# Patient Record
Sex: FEMALE | Race: WHITE | NOT HISPANIC OR LATINO
[De-identification: names, ages, dates, MRNs, and addresses within clinical notes are randomized per-mention and may not be internally consistent; named-entity substitution may affect disease eponyms.]

---

## 2017-03-01 ENCOUNTER — APPOINTMENT (OUTPATIENT)
Dept: OBGYN | Facility: CLINIC | Age: 67
End: 2017-03-01

## 2017-03-01 ENCOUNTER — OUTPATIENT (OUTPATIENT)
Dept: OUTPATIENT SERVICES | Facility: HOSPITAL | Age: 67
LOS: 1 days | Discharge: HOME | End: 2017-03-01

## 2017-03-01 VITALS — WEIGHT: 220 LBS | HEIGHT: 68 IN | BODY MASS INDEX: 33.34 KG/M2

## 2017-03-01 DIAGNOSIS — Z78.0 ASYMPTOMATIC MENOPAUSAL STATE: ICD-10-CM

## 2017-03-03 ENCOUNTER — RECORD ABSTRACTING (OUTPATIENT)
Age: 67
End: 2017-03-03

## 2017-03-03 DIAGNOSIS — Z87.410 PERSONAL HISTORY OF CERVICAL DYSPLASIA: ICD-10-CM

## 2017-03-03 DIAGNOSIS — Z80.3 FAMILY HISTORY OF MALIGNANT NEOPLASM OF BREAST: ICD-10-CM

## 2017-03-03 DIAGNOSIS — Z12.4 ENCOUNTER FOR SCREENING FOR MALIGNANT NEOPLASM OF CERVIX: ICD-10-CM

## 2017-03-06 ENCOUNTER — RESULT REVIEW (OUTPATIENT)
Age: 67
End: 2017-03-06

## 2017-03-10 LAB — HPV E6+E7 MRNA CVX QL NAA+PROBE: NOT DETECTED

## 2017-06-27 DIAGNOSIS — Z01.419 ENCOUNTER FOR GYNECOLOGICAL EXAMINATION (GENERAL) (ROUTINE) WITHOUT ABNORMAL FINDINGS: ICD-10-CM

## 2017-08-10 ENCOUNTER — OUTPATIENT (OUTPATIENT)
Dept: OUTPATIENT SERVICES | Facility: HOSPITAL | Age: 67
LOS: 1 days | Discharge: HOME | End: 2017-08-10

## 2017-08-10 ENCOUNTER — APPOINTMENT (OUTPATIENT)
Dept: GERIATRICS | Facility: CLINIC | Age: 67
End: 2017-08-10

## 2017-08-10 VITALS
BODY MASS INDEX: 32.58 KG/M2 | DIASTOLIC BLOOD PRESSURE: 90 MMHG | WEIGHT: 215 LBS | HEART RATE: 72 BPM | HEIGHT: 68 IN | SYSTOLIC BLOOD PRESSURE: 165 MMHG

## 2017-08-10 DIAGNOSIS — Z00.00 ENCOUNTER FOR GENERAL ADULT MEDICAL EXAMINATION W/OUT ABNORMAL FINDINGS: ICD-10-CM

## 2017-08-10 DIAGNOSIS — Z87.891 PERSONAL HISTORY OF NICOTINE DEPENDENCE: ICD-10-CM

## 2017-08-11 DIAGNOSIS — N85.01 BENIGN ENDOMETRIAL HYPERPLASIA: ICD-10-CM

## 2017-08-11 DIAGNOSIS — R61 GENERALIZED HYPERHIDROSIS: ICD-10-CM

## 2017-08-11 DIAGNOSIS — I10 ESSENTIAL (PRIMARY) HYPERTENSION: ICD-10-CM

## 2017-08-13 ENCOUNTER — TRANSCRIPTION ENCOUNTER (OUTPATIENT)
Age: 67
End: 2017-08-13

## 2017-09-06 ENCOUNTER — OTHER (OUTPATIENT)
Age: 67
End: 2017-09-06

## 2017-09-07 ENCOUNTER — APPOINTMENT (OUTPATIENT)
Dept: OBGYN | Facility: CLINIC | Age: 67
End: 2017-09-07

## 2017-09-07 ENCOUNTER — OUTPATIENT (OUTPATIENT)
Dept: OUTPATIENT SERVICES | Facility: HOSPITAL | Age: 67
LOS: 1 days | Discharge: HOME | End: 2017-09-07

## 2017-09-07 VITALS
HEIGHT: 68 IN | BODY MASS INDEX: 32.04 KG/M2 | WEIGHT: 211.4 LBS | SYSTOLIC BLOOD PRESSURE: 160 MMHG | DIASTOLIC BLOOD PRESSURE: 70 MMHG

## 2017-09-08 ENCOUNTER — RESULT REVIEW (OUTPATIENT)
Age: 67
End: 2017-09-08

## 2017-09-20 ENCOUNTER — MED ADMIN CHARGE (OUTPATIENT)
Age: 67
End: 2017-09-20

## 2017-09-20 ENCOUNTER — OUTPATIENT (OUTPATIENT)
Dept: OUTPATIENT SERVICES | Facility: HOSPITAL | Age: 67
LOS: 1 days | Discharge: HOME | End: 2017-09-20

## 2017-09-20 ENCOUNTER — APPOINTMENT (OUTPATIENT)
Dept: GERIATRICS | Facility: CLINIC | Age: 67
End: 2017-09-20

## 2017-09-20 VITALS
HEART RATE: 62 BPM | HEIGHT: 68 IN | BODY MASS INDEX: 32.13 KG/M2 | SYSTOLIC BLOOD PRESSURE: 148 MMHG | DIASTOLIC BLOOD PRESSURE: 83 MMHG | WEIGHT: 212 LBS

## 2017-09-21 DIAGNOSIS — E03.9 HYPOTHYROIDISM, UNSPECIFIED: ICD-10-CM

## 2017-09-21 DIAGNOSIS — I10 ESSENTIAL (PRIMARY) HYPERTENSION: ICD-10-CM

## 2017-09-21 DIAGNOSIS — Z23 ENCOUNTER FOR IMMUNIZATION: ICD-10-CM

## 2017-09-21 DIAGNOSIS — N85.01 BENIGN ENDOMETRIAL HYPERPLASIA: ICD-10-CM

## 2018-02-11 ENCOUNTER — INPATIENT (INPATIENT)
Facility: HOSPITAL | Age: 68
LOS: 1 days | Discharge: HOME | End: 2018-02-13
Attending: INTERNAL MEDICINE

## 2018-02-11 VITALS
DIASTOLIC BLOOD PRESSURE: 93 MMHG | HEART RATE: 102 BPM | SYSTOLIC BLOOD PRESSURE: 171 MMHG | RESPIRATION RATE: 18 BRPM | TEMPERATURE: 98 F | OXYGEN SATURATION: 96 %

## 2018-02-11 DIAGNOSIS — Z90.89 ACQUIRED ABSENCE OF OTHER ORGANS: Chronic | ICD-10-CM

## 2018-02-11 DIAGNOSIS — Z98.890 OTHER SPECIFIED POSTPROCEDURAL STATES: Chronic | ICD-10-CM

## 2018-02-11 DIAGNOSIS — I50.9 HEART FAILURE, UNSPECIFIED: ICD-10-CM

## 2018-02-11 DIAGNOSIS — E03.9 HYPOTHYROIDISM, UNSPECIFIED: ICD-10-CM

## 2018-02-11 DIAGNOSIS — I10 ESSENTIAL (PRIMARY) HYPERTENSION: ICD-10-CM

## 2018-02-11 DIAGNOSIS — I48.91 UNSPECIFIED ATRIAL FIBRILLATION: ICD-10-CM

## 2018-02-11 DIAGNOSIS — R07.9 CHEST PAIN, UNSPECIFIED: ICD-10-CM

## 2018-02-11 LAB
ANION GAP SERPL CALC-SCNC: 12 MMOL/L — SIGNIFICANT CHANGE UP (ref 7–14)
APTT BLD: 27.8 SEC — SIGNIFICANT CHANGE UP (ref 27–39.2)
B-TYPE NATRIURETIC PEPTIDE BNP RESULT: 636 PG/ML — HIGH (ref 0–99)
BUN SERPL-MCNC: 14 MG/DL — SIGNIFICANT CHANGE UP (ref 10–20)
CALCIUM SERPL-MCNC: 9.1 MG/DL — SIGNIFICANT CHANGE UP (ref 8.5–10.1)
CHLORIDE SERPL-SCNC: 104 MMOL/L — SIGNIFICANT CHANGE UP (ref 98–110)
CK MB BLD-MCNC: 4 % — SIGNIFICANT CHANGE UP (ref 0–4)
CK MB CFR SERPL CALC: 2.5 NG/ML — SIGNIFICANT CHANGE UP (ref 0.6–6.3)
CK SERPL-CCNC: 64 U/L — SIGNIFICANT CHANGE UP (ref 0–225)
CO2 SERPL-SCNC: 23 MMOL/L — SIGNIFICANT CHANGE UP (ref 17–32)
CREAT SERPL-MCNC: 0.9 MG/DL — SIGNIFICANT CHANGE UP (ref 0.7–1.5)
GLUCOSE SERPL-MCNC: 71 MG/DL — SIGNIFICANT CHANGE UP (ref 70–110)
INR BLD: 1.22 RATIO — SIGNIFICANT CHANGE UP (ref 0.65–1.3)
LIDOCAIN IGE QN: 20 U/L — SIGNIFICANT CHANGE UP (ref 7–60)
POTASSIUM SERPL-MCNC: 4.1 MMOL/L — SIGNIFICANT CHANGE UP (ref 3.5–5)
POTASSIUM SERPL-SCNC: 4.1 MMOL/L — SIGNIFICANT CHANGE UP (ref 3.5–5)
PROTHROM AB SERPL-ACNC: 13.2 SEC — HIGH (ref 9.95–12.87)
SODIUM SERPL-SCNC: 139 MMOL/L — SIGNIFICANT CHANGE UP (ref 135–146)
TROPONIN I SERPL-MCNC: 0.03 NG/ML — SIGNIFICANT CHANGE UP (ref 0–0.05)

## 2018-02-11 RX ORDER — LEVOTHYROXINE SODIUM 125 MCG
0 TABLET ORAL
Qty: 0 | Refills: 0 | COMMUNITY

## 2018-02-11 RX ORDER — METOPROLOL TARTRATE 50 MG
25 TABLET ORAL
Qty: 0 | Refills: 0 | Status: DISCONTINUED | OUTPATIENT
Start: 2018-02-11 | End: 2018-02-12

## 2018-02-11 RX ORDER — ASPIRIN/CALCIUM CARB/MAGNESIUM 324 MG
81 TABLET ORAL DAILY
Qty: 0 | Refills: 0 | Status: DISCONTINUED | OUTPATIENT
Start: 2018-02-11 | End: 2018-02-13

## 2018-02-11 RX ORDER — ENOXAPARIN SODIUM 100 MG/ML
80 INJECTION SUBCUTANEOUS ONCE
Qty: 0 | Refills: 0 | Status: COMPLETED | OUTPATIENT
Start: 2018-02-11 | End: 2018-02-11

## 2018-02-11 RX ORDER — ENOXAPARIN SODIUM 100 MG/ML
80 INJECTION SUBCUTANEOUS
Qty: 0 | Refills: 0 | Status: DISCONTINUED | OUTPATIENT
Start: 2018-02-12 | End: 2018-02-12

## 2018-02-11 RX ORDER — SODIUM CHLORIDE 9 MG/ML
3 INJECTION INTRAMUSCULAR; INTRAVENOUS; SUBCUTANEOUS ONCE
Qty: 0 | Refills: 0 | Status: COMPLETED | OUTPATIENT
Start: 2018-02-11 | End: 2018-02-11

## 2018-02-11 RX ORDER — LEVOTHYROXINE SODIUM 125 MCG
50 TABLET ORAL DAILY
Qty: 0 | Refills: 0 | Status: DISCONTINUED | OUTPATIENT
Start: 2018-02-11 | End: 2018-02-13

## 2018-02-11 RX ORDER — ASPIRIN/CALCIUM CARB/MAGNESIUM 324 MG
325 TABLET ORAL ONCE
Qty: 0 | Refills: 0 | Status: COMPLETED | OUTPATIENT
Start: 2018-02-11 | End: 2018-02-11

## 2018-02-11 RX ORDER — FUROSEMIDE 40 MG
40 TABLET ORAL
Qty: 0 | Refills: 0 | Status: DISCONTINUED | OUTPATIENT
Start: 2018-02-11 | End: 2018-02-12

## 2018-02-11 RX ADMIN — SODIUM CHLORIDE 3 MILLILITER(S): 9 INJECTION INTRAMUSCULAR; INTRAVENOUS; SUBCUTANEOUS at 10:00

## 2018-02-11 RX ADMIN — Medication 325 MILLIGRAM(S): at 10:13

## 2018-02-11 RX ADMIN — Medication 40 MILLIGRAM(S): at 21:44

## 2018-02-11 RX ADMIN — ENOXAPARIN SODIUM 80 MILLIGRAM(S): 100 INJECTION SUBCUTANEOUS at 21:41

## 2018-02-11 RX ADMIN — Medication 81 MILLIGRAM(S): at 21:46

## 2018-02-11 RX ADMIN — Medication 25 MILLIGRAM(S): at 21:45

## 2018-02-11 NOTE — H&P ADULT - FAMILY HISTORY
Grandparent  Still living? Unknown  Family history of heart disease, Age at diagnosis: Age Unknown     Mother  Still living? Unknown  Family history of breast cancer, Age at diagnosis: Age Unknown

## 2018-02-11 NOTE — H&P ADULT - PROBLEM SELECTOR PLAN 5
- R/O ACS  - Check serial CE  - Daily EKGs  - F/U with cardiology - R/O ACS  - Check serial CE  - Continue Aspirin  - Daily EKGs  - F/U with cardiology

## 2018-02-11 NOTE — ED PROVIDER NOTE - PROGRESS NOTE DETAILS
Patient initially improved with cardizem, but HR over time increased back into 140s/150s. patient adamant that she wants to go home to feed her cats. patient agreed to let me speak to friends/family who came to bedside. fam/friends say they will feed cat and patient should stay but she still wants to go home. understands the risk of death.  patient agreed to let me giver her another IV push of cardizem and to give her an oral dose. will re assess and push for admission spoke to radiologist who says that patient does not have PE; bilateral pleural effusions, continues to fit CHF picture

## 2018-02-11 NOTE — H&P ADULT - ATTENDING COMMENTS
Patient seen and examined independently agree with resident.    Afib with RVR-- iv cardizem changed to multaq may get cardioversion either inpatient if not controlled with multaq or outpatient. Continue lovenox.     CF lasix 40mg changed from IV to PO. echo pending      Hypothyroidim on synthroid  awaiting TSH levels Patient seen and examined independently agree with resident.    Afib with RVR-- iv cardizem changed to multaq may get cardioversion either inpatient if not controlled with multaq or outpatient. Continue eliquis.     CF lasix 40mg changed from IV to PO. echo pending      Hypothyroidim on synthroid  awaiting TSH levels

## 2018-02-11 NOTE — ED PROVIDER NOTE - NS ED ROS FT
Eyes:  No visual changes, eye pain or discharge.  ENMT:  No hearing changes, pain, discharge or infections. No neck pain or stiffness.  Cardiac: see hpi  Respiratory:  see hpi  GI:  No nausea, vomiting, diarrhea or abdominal pain.  :  No dysuria, frequency or burning.  MS:  No myalgia, muscle weakness, joint pain or back pain.  Neuro:  No headache or weakness.  No LOC.  Skin:  No skin rash.   Endocrine: No history of thyroid disease or diabetes.

## 2018-02-11 NOTE — ED PROVIDER NOTE - OBJECTIVE STATEMENT
67yF pmh including afib, not on blood thinners, HTN, "pre cancerous" lesion in uterus on IUD, cc sob x 1-2 days, woke from sleep this morning, worse with exertion, intermittent, started yesterday as pain under right rib. no relation to food, no nausea or vomiting. no back pain.

## 2018-02-11 NOTE — H&P ADULT - NSHPPHYSICALEXAM_GEN_ALL_CORE
Vital Signs Last 24 Hrs  T(C): 36.3 (11 Feb 2018 17:35), Max: 36.7 (11 Feb 2018 07:59)  T(F): 97.4 (11 Feb 2018 17:35), Max: 98.1 (11 Feb 2018 07:59)  HR: 96 (11 Feb 2018 17:35) (96 - 156)  BP: 130/88 (11 Feb 2018 17:35) (113/55 - 171/93)  RR: 18 (11 Feb 2018 17:35) (18 - 18)  SpO2: 96% (11 Feb 2018 17:35) (96% - 97%)\    < from: CT Chest w/ IV Cont (02.11.18 @ 17:15) >  IMPRESSION:  No central pulmonary embolus.  Bilateral moderate pleural effusions, right greater than left.   Partially imaged upper abdomen is unremarkable.  < end of copied text >

## 2018-02-11 NOTE — H&P ADULT - ASSESSMENT
67 y.o female patient with PMH of atrial fibrillation diagnosed 5 years ago, htn currently controlled with lifestyle modifications, hypothyroidism presents with chest pain and shortness of breath on exertion, found to have a.fib with rvr.

## 2018-02-11 NOTE — H&P ADULT - PROBLEM SELECTOR PLAN 2
- Picture of CHF exacerbation with sob on exertion, le edema, pleural effusion and   - No known CHF history  - Will start Lasix 4mg q12h for now. Adjust dose based on clinical response. Monitor VS and BMP. Monitor strict I&O's  - Check 2D echo  - Most likely due to rapid ventricular rate - Picture of CHF exacerbation with sob on exertion, le edema, pleural effusion and   - No known CHF history  - Will start Lasix 4mg q12h for now. Adjust dose based on clinical response. Monitor VS and BMP. Monitor strict I&O's  - Check 2D echo  - Most likely due to rapid ventricular rate  - If reduced EF on 2D echo and based on other findings: control HR with beta-blockers, consider ischemic w/u and ACE-I/ARB if tolerated.

## 2018-02-11 NOTE — ED PROVIDER NOTE - CARE PLAN
Principal Discharge DX:	Atrial fibrillation with RVR  Secondary Diagnosis:	CHF (congestive heart failure)

## 2018-02-11 NOTE — ED PROVIDER NOTE - PHYSICAL EXAMINATION
CONSTITUTIONAL: Well-developed; well-nourished; in no acute distress.   SKIN: warm, dry  HEAD: Normocephalic; atraumatic.  EYES: no conj injection  ENT: No nasal discharge; airway clear.  NECK: Supple; non tender.  CARD: S1, S2 normal; no murmurs, gallops, or rubs. tachycardic, irregular rhythm, 2+ radial bilaterally  RESP: No wheezes, rales or rhonchi.  ABD: soft ntnd  EXT: Normal ROM.  No clubbing, cyanosis or edema.   LYMPH: No acute cervical adenopathy.  NEURO: Alert, oriented, grossly unremarkable  PSYCH: Cooperative, appropriate.

## 2018-02-11 NOTE — H&P ADULT - PROBLEM SELECTOR PLAN 1
- CHADSVASC 3  - Start Metoprolol 25mg q12 and titrate to effect  - Start Lovenox anticoagulation dose. Watch for signs and symptoms of bleeding  - Patient reports having a congenital "hole in the heart" (VSD?, ASD?, PFO?) but no interventions were needed  - Check 2D echo (as per patient, she had a 2D echo done more than 6 months but less than 1 year ago and reports being WNL; results not currently available)  - If no valvular disease, consider choosing Eliquis/Xarelto for anticoagulation  - F/U second set of cardiac enzymes  - Will place a cardiology consult (Dr. Suarez). Patient was on Multaq that was discontinued in 11/2017  - Check TSH  - Monitor VS  - Daily EKGs - CHADSVASC 3  - Start Metoprolol 25mg q12 and titrate to effect  - Start Lovenox 80mg q12. Please adjust dose (1mg/kg) if needed once weight is measured in-hospital. Watch for signs and symptoms of bleeding  - Patient reports having a congenital "hole in the heart" (VSD?, ASD?, PFO?) but no interventions were needed  - Check 2D echo (as per patient, she had a 2D echo done more than 6 months but less than 1 year ago and reports being WNL; results not currently available)  - If no valvular disease, consider choosing Eliquis/Xarelto for anticoagulation  - F/U second set of cardiac enzymes  - Will place a cardiology consult (Dr. Suarez). Patient was on Multaq that was discontinued in 11/2017  - Check TSH  - Monitor VS  - Daily EKGs - CHADSVASC 3  - Start Metoprolol 25mg q12 and titrate to effect  - Start Lovenox 80mg q12. Please adjust dose (1mg/kg) if needed once weight is measured in-hospital. No history of severe bleeding. Watch for signs and symptoms of bleeding  - Patient reports having a congenital "hole in the heart" (VSD?, ASD?, PFO?) but no interventions were needed  - Check 2D echo (as per patient, she had a 2D echo done more than 6 months but less than 1 year ago and reports being WNL; results not currently available)  - If no valvular disease, consider choosing Eliquis/Xarelto for anticoagulation  - F/U second set of cardiac enzymes  - Will place a cardiology consult (Dr. Suarez). Patient was on Multaq that was discontinued in 11/2017  - Check TSH  - Monitor VS  - Daily EKGs - CHADSVASC 3  - Control rate with Cardizem  - Start Lovenox 80mg q12. Please adjust dose (1mg/kg) if needed once weight is measured in-hospital. No history of severe bleeding. Watch for signs and symptoms of bleeding  - Patient reports having a congenital "hole in the heart" (VSD?, ASD?, PFO?) but no interventions were needed  - Check 2D echo (as per patient, she had a 2D echo done more than 6 months but less than 1 year ago and reports being WNL; results not currently available)  - If no valvular disease, consider choosing Eliquis/Xarelto for anticoagulation  - F/U second set of cardiac enzymes  - Will place a cardiology consult (Dr. Suarez). Patient was on Multaq that was discontinued in 11/2017  - Check TSH  - Monitor VS  - Daily EKGs

## 2018-02-11 NOTE — ED ADULT NURSE NOTE - OBJECTIVE STATEMENT
Pt c/o of ANGULO since friday. Pt states that it got worse this morning and had SOB upon walking up a flight of stairs. Pt has hx of Afib and had been taken off medication since 12/2017. Pt took herself off HTN medication around the same time.

## 2018-02-11 NOTE — H&P ADULT - HISTORY OF PRESENT ILLNESS
67 y.o female patient with PMH of atrial fibirillation, HTN, hypothyroidism, presented to the ED for chest pain and shortness of breath on exertion    History goes back to few days prior to presentation when patient started complaining of right lower rib pain. Today, patient started having shortness of breath after climbing her house's stairs. Shortness of breath associated with chest heaviness. SOB is worse on exertion and with deep inspirations and SOB and chest heaviness resolve at rest. She usually has bilateral lower extremity edema due to vein disease but lately she noticed worsening of her edema.  Patient usually is able to walk a few miles without any problem  Patient denies palpitations, fever, chills, viral illness, abdominal pain, N/V, diarrhea or constipation or any other symptoms.    She was diagnosed with atrial fibrillation 5 years ago and follows-up with Dr. Suarez. She was prescribed Multaq that was discontinued in 11/2017. She is not on anticoagulation. Also, as per patient, she was taken off of her antihypertensive medications in 11/2017 since she lost weight and her BP was controlled off medications    In ED, patient was in atrial fibrillation with rapid ventricular rate, given cardizem and HR decreased to 90's

## 2018-02-11 NOTE — H&P ADULT - NSHPLABSRESULTS_GEN_ALL_CORE
Labs:      02-11    139  |  104  |  14  ----------------------------<  71  4.1   |  23  |  0.9    Ca    9.1      11 Feb 2018 08:47    PT/INR - ( 11 Feb 2018 08:47 )   PT: 13.20 sec;   INR: 1.22 ratio         PTT - ( 11 Feb 2018 08:47 )  PTT:27.8 sec      CARDIAC MARKERS ( 11 Feb 2018 08:47 )  0.03 ng/mL / x     / 64 U/L / x     / 2.5 ng/mL

## 2018-02-12 LAB
ANION GAP SERPL CALC-SCNC: 10 MMOL/L — SIGNIFICANT CHANGE UP (ref 7–14)
BASOPHILS # BLD AUTO: 0.04 K/UL — SIGNIFICANT CHANGE UP (ref 0–0.2)
BASOPHILS NFR BLD AUTO: 0.8 % — SIGNIFICANT CHANGE UP (ref 0–1)
BUN SERPL-MCNC: 12 MG/DL — SIGNIFICANT CHANGE UP (ref 10–20)
CALCIUM SERPL-MCNC: 8.8 MG/DL — SIGNIFICANT CHANGE UP (ref 8.5–10.1)
CHLORIDE SERPL-SCNC: 105 MMOL/L — SIGNIFICANT CHANGE UP (ref 98–110)
CK MB BLD-MCNC: 6 % — HIGH (ref 0–4)
CK MB CFR SERPL CALC: 3.5 NG/ML — SIGNIFICANT CHANGE UP (ref 0.6–6.3)
CK MB CFR SERPL CALC: 3.9 NG/ML — SIGNIFICANT CHANGE UP (ref 0.6–6.3)
CK SERPL-CCNC: 63 U/L — SIGNIFICANT CHANGE UP (ref 0–225)
CK SERPL-CCNC: 72 U/L — SIGNIFICANT CHANGE UP (ref 0–225)
CO2 SERPL-SCNC: 26 MMOL/L — SIGNIFICANT CHANGE UP (ref 17–32)
CREAT SERPL-MCNC: 1 MG/DL — SIGNIFICANT CHANGE UP (ref 0.7–1.5)
EOSINOPHIL # BLD AUTO: 0.07 K/UL — SIGNIFICANT CHANGE UP (ref 0–0.7)
EOSINOPHIL NFR BLD AUTO: 1.4 % — SIGNIFICANT CHANGE UP (ref 0–8)
GLUCOSE SERPL-MCNC: 94 MG/DL — SIGNIFICANT CHANGE UP (ref 70–110)
HCT VFR BLD CALC: 42.4 % — SIGNIFICANT CHANGE UP (ref 37–47)
HGB BLD-MCNC: 14.2 G/DL — SIGNIFICANT CHANGE UP (ref 14–18)
IMM GRANULOCYTES NFR BLD AUTO: 0.4 % — HIGH (ref 0.1–0.3)
LYMPHOCYTES # BLD AUTO: 1.36 K/UL — SIGNIFICANT CHANGE UP (ref 1.2–3.4)
LYMPHOCYTES # BLD AUTO: 26.3 % — SIGNIFICANT CHANGE UP (ref 20.5–51.1)
MAGNESIUM SERPL-MCNC: 1.9 MG/DL — SIGNIFICANT CHANGE UP (ref 1.8–2.4)
MCHC RBC-ENTMCNC: 31.6 PG — HIGH (ref 27–31)
MCHC RBC-ENTMCNC: 33.5 G/DL — SIGNIFICANT CHANGE UP (ref 32–37)
MCV RBC AUTO: 94.2 FL — HIGH (ref 81–91)
MONOCYTES # BLD AUTO: 0.59 K/UL — SIGNIFICANT CHANGE UP (ref 0.1–0.6)
MONOCYTES NFR BLD AUTO: 11.4 % — HIGH (ref 1.7–9.3)
NEUTROPHILS # BLD AUTO: 3.1 K/UL — SIGNIFICANT CHANGE UP (ref 1.4–6.5)
NEUTROPHILS NFR BLD AUTO: 59.7 % — SIGNIFICANT CHANGE UP (ref 42.2–75.2)
PLATELET # BLD AUTO: 154 K/UL — SIGNIFICANT CHANGE UP (ref 130–400)
POTASSIUM SERPL-MCNC: 3.5 MMOL/L — SIGNIFICANT CHANGE UP (ref 3.5–5)
POTASSIUM SERPL-SCNC: 3.5 MMOL/L — SIGNIFICANT CHANGE UP (ref 3.5–5)
RBC # BLD: 4.5 M/UL — SIGNIFICANT CHANGE UP (ref 4.2–5.4)
RBC # FLD: 12.6 % — SIGNIFICANT CHANGE UP (ref 11.5–14.5)
SODIUM SERPL-SCNC: 141 MMOL/L — SIGNIFICANT CHANGE UP (ref 135–146)
TROPONIN I SERPL-MCNC: 0.02 NG/ML — SIGNIFICANT CHANGE UP (ref 0–0.05)
TROPONIN I SERPL-MCNC: 0.02 NG/ML — SIGNIFICANT CHANGE UP (ref 0–0.05)
WBC # BLD: 5.18 K/UL — SIGNIFICANT CHANGE UP (ref 4.8–10.8)
WBC # FLD AUTO: 5.18 K/UL — SIGNIFICANT CHANGE UP (ref 4.8–10.8)

## 2018-02-12 RX ORDER — APIXABAN 2.5 MG/1
5 TABLET, FILM COATED ORAL EVERY 12 HOURS
Qty: 0 | Refills: 0 | Status: DISCONTINUED | OUTPATIENT
Start: 2018-02-12 | End: 2018-02-13

## 2018-02-12 RX ORDER — DRONEDARONE 400 MG/1
400 TABLET, FILM COATED ORAL EVERY 12 HOURS
Qty: 0 | Refills: 0 | Status: DISCONTINUED | OUTPATIENT
Start: 2018-02-12 | End: 2018-02-13

## 2018-02-12 RX ORDER — FUROSEMIDE 40 MG
40 TABLET ORAL DAILY
Qty: 0 | Refills: 0 | Status: DISCONTINUED | OUTPATIENT
Start: 2018-02-12 | End: 2018-02-13

## 2018-02-12 RX ADMIN — Medication 40 MILLIGRAM(S): at 14:38

## 2018-02-12 RX ADMIN — DRONEDARONE 400 MILLIGRAM(S): 400 TABLET, FILM COATED ORAL at 16:49

## 2018-02-12 RX ADMIN — Medication 50 MICROGRAM(S): at 06:07

## 2018-02-12 RX ADMIN — APIXABAN 5 MILLIGRAM(S): 2.5 TABLET, FILM COATED ORAL at 17:46

## 2018-02-12 RX ADMIN — Medication 81 MILLIGRAM(S): at 14:43

## 2018-02-12 RX ADMIN — Medication 40 MILLIGRAM(S): at 06:06

## 2018-02-12 RX ADMIN — ENOXAPARIN SODIUM 80 MILLIGRAM(S): 100 INJECTION SUBCUTANEOUS at 06:06

## 2018-02-12 NOTE — PROGRESS NOTE ADULT - SUBJECTIVE AND OBJECTIVE BOX
Patient is a 67y old  Female who presents with a chief complaint of shortness of breath (12 Feb 2018 01:30)    Patient seen and examined. no complaints    Overnight events:none    PAST MEDICAL & SURGICAL HISTORY:  Adult hypothyroidism  HTN (hypertension)  Atrial fibrillation, unspecified type  Status post tonsillectomy  History of dilatation and curettage      MEDICATIONS  (STANDING):  apixaban 5 milliGRAM(s) Oral every 12 hours  aspirin enteric coated 81 milliGRAM(s) Oral daily  diltiazem    Tablet 90 milliGRAM(s) Oral every 8 hours  dronedarone 400 milliGRAM(s) Oral every 12 hours  furosemide    Tablet 40 milliGRAM(s) Oral daily  levothyroxine 50 MICROGram(s) Oral daily          I&O's Summary    11 Feb 2018 07:01  -  12 Feb 2018 07:00  --------------------------------------------------------  IN: 150 mL / OUT: 1150 mL / NET: -1000 mL    12 Feb 2018 07:01  -  12 Feb 2018 14:56  --------------------------------------------------------  IN: 50 mL / OUT: 1150 mL / NET: -1100 mL        T(C): 36.4 (02-12-18 @ 14:00), Max: 37.1 (02-11-18 @ 22:50)  HR: 83 (02-12-18 @ 10:48) (83 - 140)  BP: 115/68 (02-12-18 @ 14:00) (115/68 - 150/70)  RR: 19 (02-12-18 @ 14:00) (18 - 19)  SpO2: 95% (02-12-18 @ 01:00) (95% - 96%)    PHYSICAL EXAM:    GENERAL: NAD, well-developed  CHEST/LUNG: Clear to auscultation bilaterally; No wheeze  HEART: irregular heart rate  ABDOMEN: Soft, Nontender, Nondistended; Bowel sounds present  EXTREMITIES:  2+ Peripheral Pulses, No clubbing, cyanosis, or edema  PSYCH: AAOx3  NEUROLOGY: non-focal  SKIN: No rashes or lesions    Labs:                        14.2   5.18  )-----------( 154      ( 12 Feb 2018 04:47 )             42.4             02-12    141  |  105  |  12  ----------------------------<  94  3.5   |  26  |  1.0    Ca    8.8      12 Feb 2018 04:47  Mg     1.9     02-12              PT/INR - ( 11 Feb 2018 08:47 )   PT: 13.20 sec;   INR: 1.22 ratio         PTT - ( 11 Feb 2018 08:47 )  PTT:27.8 sec  CARDIAC MARKERS ( 12 Feb 2018 04:47 )  0.02 ng/mL / x     / 72 U/L / x     / 3.9 ng/mL  CARDIAC MARKERS ( 12 Feb 2018 01:26 )  0.02 ng/mL / x     / 63 U/L / x     / 3.5 ng/mL  CARDIAC MARKERS ( 11 Feb 2018 08:47 )  0.03 ng/mL / x     / 64 U/L / x     / 2.5 ng/mL      Xray Chest 1 View- PORTABLE-Routine (02.12.18 @ 10:20)    Slightly increased bilateral pleural effusions and bibasilar   opacities/atelectasis.  Mild interstitial edema.

## 2018-02-12 NOTE — CONSULT NOTE ADULT - ATTENDING COMMENTS
Agree with above. Anticoagulation and diurese. ZAHRA/Cardioversion as an outpatient after 6-8 weeks of AC.

## 2018-02-12 NOTE — CONSULT NOTE ADULT - SUBJECTIVE AND OBJECTIVE BOX
Date of Admission: 2018    CHIEF COMPLAINT: shortness of breath    HISTORY OF PRESENT ILLNESS: 67yFemale with PMH below presented to the hospital for     PAST MEDICAL & SURGICAL HISTORY:  Adult hypothyroidism  HTN (hypertension)  Atrial fibrillation, unspecified type  Status post tonsillectomy  History of dilatation and curettage    HEALTH ISSUES - PROBLEM Dx:  Chest pain in adult: Chest pain in adult  Hypertension, unspecified type: Hypertension, unspecified type  Adult hypothyroidism: Adult hypothyroidism  Acute congestive heart failure, unspecified congestive heart failure type: Acute congestive heart failure, unspecified congestive heart failure type  Atrial fibrillation with RVR: Atrial fibrillation with RVR        FAMILY HISTORY:  Family history of breast cancer (Mother)  Family history of heart disease (Grandparent)    Allergies    Fructose (Other)  No Known Drug Allergies    Intolerances    	  Home Medications:  aspirin 81 mg oral delayed release tablet: 1 tab(s) orally once a day (2018 20:56)  levothyroxine 50 mcg (0.05 mg) oral tablet: 1 tab(s) orally once a day (2018 20:56)    MEDICATIONS  (STANDING):  aspirin enteric coated 81 milliGRAM(s) Oral daily  enoxaparin Injectable 80 milliGRAM(s) SubCutaneous two times a day  furosemide   Injectable 40 milliGRAM(s) IV Push two times a day  levothyroxine 50 MICROGram(s) Oral daily  metoprolol     tartrate 25 milliGRAM(s) Oral two times a day    MEDICATIONS  (PRN):              SOCIAL HISTORY:    [ ] Non-smoker  [ ] Smoker  [ ] Alcohol      REVIEW OF SYSTEMS:  CONSTITUTIONAL: No fever, weight loss, or fatigue  CARDIOLOGY: PAtient denies chest pain, shortness of breath or syncopal episodes.   RESPIRATORY: denies shortness of breath, wheezeing.   NEUROLOGICAL: NO weakness, no focal deficits to report.  ENDOCRINOLOGICAL: no recent change in diabetic medications.   GI: no BRBPR, no N,V,diarrhea.    PSYCHIATRY: normal mood and affect  HEENT: no nasal discharge, no ecchymosis  SKIN: no ecchymosis, no breakdown  MUSCULOSKELETAL: Full range of motion x4.      PHYSICAL EXAM:  T(C): 36.9 (18 @ 01:00), Max: 37.1 (18 @ 22:50)  HR: 140 (18 @ 01:00) (96 - 156)  BP: 122/89 (18 @ 01:00) (113/55 - 171/93)  RR: 18 (18 @ 01:00) (18 - 18)  SpO2: 95% (18 @ 01:00) (95% - 97%)  Wt(kg): --  I&O's Summary    2018 07:01  -  2018 02:58  --------------------------------------------------------  IN: 120 mL / OUT: 300 mL / NET: -180 mL      Daily Height in cm: 172.72 (2018 01:00)    Daily Weight in k.8 (2018 01:00)    General Appearance: Normal	  Cardiovascular: Normal S1 S2, No JVD, No murmurs, No edema  Respiratory: Lungs clear to auscultation	  Psychiatry: A & O x 3, Mood & affect appropriate  Gastrointestinal:  Soft, Non-tender  Skin: No rashes, No ecchymoses, No cyanosis	  Neurologic: Non-focal  Extremities: Normal range of motion, No clubbing, cyanosis or edema  Vascular: Peripheral pulses palpable 2+ bilaterally        LABS:	 	          139  |  104  |  14  ----------------------------<  71  4.1   |  23  |  0.9    Ca    9.1      2018 08:47      CARDIAC MARKERS ( 2018 08:47 )  0.03 ng/mL / x     / 64 U/L / x     / 2.5 ng/mL      PT/INR - ( 2018 08:47 )   PT: 13.20 sec;   INR: 1.22 ratio         PTT - ( 2018 08:47 )  PTT:27.8 sec    proBNP:   Lipid Profile:   HgA1c:   TSH:       CARDIAC MARKERS:  Troponin I, Serum: 0.03 ng/mL ( @ 08:47)            TELEMETRY EVENTS: 	    ECG:  	  RADIOLOGY:  OTHER: 	    PREVIOUS DIAGNOSTIC TESTING:    [ ] Echocardiogram:  [ ]  Catheterization:  [ ] Stress Test:  	  	  ASSESSMENT/PLAN: Date of Admission: 2018    CHIEF COMPLAINT: shortness of breath    HISTORY OF PRESENT ILLNESS: 67yFemale with PMH below presented to the hospital for shortness of breath on exertion with some mild chest discomfort. History goes back to few days prior to presentation when patient started complaining of right lower rib pain. Today, patient started having shortness of breath after climbing her house's stairs. Shortness of breath associated with chest heaviness. SOB is worse on exertion and with deep inspirations and SOB and chest heaviness resolve at rest. She usually has bilateral lower extremity edema due to vein disease but lately she noticed worsening of her edema.  Patient usually is able to walk a few miles without any problem  Patient denies palpitations, fever, chills, viral illness, abdominal pain, N/V, diarrhea or constipation or any other symptoms.    She was diagnosed with atrial fibrillation 5 years ago and follows-up with Dr. Suarez. She was prescribed Multaq that was discontinued in 2017. She is not on anticoagulation. Also, as per patient, she was taken off of her antihypertensive medications in 2017 since she lost weight and her BP was controlled off medications    PAST MEDICAL & SURGICAL HISTORY:  Adult hypothyroidism  HTN (hypertension)  Atrial fibrillation, unspecified type  Status post tonsillectomy  History of dilatation and curettage    HEALTH ISSUES - PROBLEM Dx:  Chest pain in adult: Chest pain in adult  Hypertension, unspecified type: Hypertension, unspecified type  Adult hypothyroidism: Adult hypothyroidism  Acute congestive heart failure, unspecified congestive heart failure type: Acute congestive heart failure, unspecified congestive heart failure type  Atrial fibrillation with RVR: Atrial fibrillation with RVR        FAMILY HISTORY:  Family history of breast cancer (Mother)  Family history of heart disease (Grandparent)    Allergies    Fructose (Other)  No Known Drug Allergies    Intolerances    	  Home Medications:  aspirin 81 mg oral delayed release tablet: 1 tab(s) orally once a day (2018 20:56)  levothyroxine 50 mcg (0.05 mg) oral tablet: 1 tab(s) orally once a day (2018 20:56)    MEDICATIONS  (STANDING):  aspirin enteric coated 81 milliGRAM(s) Oral daily  enoxaparin Injectable 80 milliGRAM(s) SubCutaneous two times a day  furosemide   Injectable 40 milliGRAM(s) IV Push two times a day  levothyroxine 50 MICROGram(s) Oral daily  metoprolol     tartrate 25 milliGRAM(s) Oral two times a day    MEDICATIONS  (PRN):              SOCIAL HISTORY:    [ ] Non-smoker  [ ] Smoker  [ ] Alcohol      REVIEW OF SYSTEMS:  CONSTITUTIONAL: No fever, weight loss, or fatigue  CARDIOLOGY: see HPI  RESPIRATORY: see HPI  NEUROLOGICAL: NO weakness, no focal deficits to report.  ENDOCRINOLOGICAL: no recent change in diabetic medications.   GI: no BRBPR, no N,V,diarrhea.    PSYCHIATRY: normal mood and affect  HEENT: no nasal discharge, no ecchymosis  SKIN: no ecchymosis, no breakdown  MUSCULOSKELETAL: Full range of motion x4.      PHYSICAL EXAM:  T(C): 36.9 (18 @ 01:00), Max: 37.1 (18 @ 22:50)  HR: 140 (18 @ 01:00) (96 - 156)  BP: 122/89 (18 @ 01:00) (113/55 - 171/93)  RR: 18 (18 @ 01:00) (18 - 18)  SpO2: 95% (18 @ 01:00) (95% - 97%)  Wt(kg): --  I&O's Summary    2018 07:01  -  2018 02:58  --------------------------------------------------------  IN: 120 mL / OUT: 300 mL / NET: -180 mL      Daily Height in cm: 172.72 (2018 01:00)    Daily Weight in k.8 (2018 01:00)    General Appearance: Normal	  Cardiovascular: irregular and rapidl S1 S2, No JVD, No murmurs, No edema  Respiratory: mild crackles to B/L lower lung fields  Psychiatry: A & O x 3, Mood & affect appropriate  Gastrointestinal:  Soft, Non-tender  Skin: No rashes, No ecchymoses, No cyanosis	  Neurologic: Non-focal  Extremities: Normal range of motion, No clubbing, cyanosis. +LE edema  Vascular: Peripheral pulses palpable 2+ bilaterally        LABS:	 	          139  |  104  |  14  ----------------------------<  71  4.1   |  23  |  0.9    Ca    9.1      2018 08:47      CARDIAC MARKERS ( 2018 08:47 )  0.03 ng/mL / x     / 64 U/L / x     / 2.5 ng/mL      PT/INR - ( 2018 08:47 )   PT: 13.20 sec;   INR: 1.22 ratio         PTT - ( 2018 08:47 )  PTT:27.8 sec    proBNP: 664  Lipid Profile:   HgA1c:   TSH:       CARDIAC MARKERS:  Troponin I, Serum: 0.03 ng/mL ( @ 08:47)            TELEMETRY EVENTS: 	  rapid afib  ECG:  < from: 12 Lead ECG (18 @ 10:22) >  Atrial fibrillation with rapid ventricular response  Septal infarct , age undetermined  Abnormal ECG    < end of copied text >  	  RADIOLOGY: < from: Xray Chest 2 Views PA/Lat (18 @ 10:00) >  Bilateral interstitial opacities with slightly more confluent opacities   at both lung bases.     < end of copied text >    OTHER: 	    PREVIOUS DIAGNOSTIC TESTING:    [ ] Echocardiogram:  [ ]  Catheterization:  [ ] Stress Test:

## 2018-02-12 NOTE — CONSULT NOTE ADULT - ASSESSMENT
Acute decomensated Heart failure  - admit to telemetry  - possibly has tachycardia induced cardiomyopathy  - IV lasix  -patient to be on BB/ ACE or ARB once euvolemic  - 2d echo  - supplement oxygen as needed    Atrial Fibrillation with RVR  - CHADSVASC 3  - rate control with cardizem for now  - would anticoagulate  - was on multaq until 11/17

## 2018-02-12 NOTE — PROGRESS NOTE ADULT - ASSESSMENT
67 y.o female patient with PMH of atrial fibrillation presents with chest pain and shortness of breath on exertion, found to have a.fib with rvr.      1. Atrial fibrillation with RVR.  P - CHADSVASC 3  - changed from IV cardizem to PO cardizem today  added multaq as per EP and NPO after midnight for possible cardioversion if she doesn't revert with multaq  eliquis for anticoagulation  Check 2D echo, still pending  - seen by Dr. Suarez  - Check TSH  .    2. 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  Plan: - Check TSH  - Continue Levothyroxine 50mcg q24h for now.     4 dvt prophylaxis with eliquis

## 2018-02-13 ENCOUNTER — TRANSCRIPTION ENCOUNTER (OUTPATIENT)
Age: 68
End: 2018-02-13

## 2018-02-13 VITALS
TEMPERATURE: 98 F | DIASTOLIC BLOOD PRESSURE: 63 MMHG | RESPIRATION RATE: 18 BRPM | HEART RATE: 71 BPM | SYSTOLIC BLOOD PRESSURE: 132 MMHG

## 2018-02-13 LAB
ANION GAP SERPL CALC-SCNC: 8 MMOL/L — SIGNIFICANT CHANGE UP (ref 7–14)
BASOPHILS # BLD AUTO: 0.03 K/UL — SIGNIFICANT CHANGE UP (ref 0–0.2)
BASOPHILS NFR BLD AUTO: 0.5 % — SIGNIFICANT CHANGE UP (ref 0–1)
BUN SERPL-MCNC: 14 MG/DL — SIGNIFICANT CHANGE UP (ref 10–20)
CALCIUM SERPL-MCNC: 8.8 MG/DL — SIGNIFICANT CHANGE UP (ref 8.5–10.1)
CHLORIDE SERPL-SCNC: 101 MMOL/L — SIGNIFICANT CHANGE UP (ref 98–110)
CO2 SERPL-SCNC: 29 MMOL/L — SIGNIFICANT CHANGE UP (ref 17–32)
CREAT SERPL-MCNC: 0.9 MG/DL — SIGNIFICANT CHANGE UP (ref 0.7–1.5)
EOSINOPHIL # BLD AUTO: 0.09 K/UL — SIGNIFICANT CHANGE UP (ref 0–0.7)
EOSINOPHIL NFR BLD AUTO: 1.4 % — SIGNIFICANT CHANGE UP (ref 0–8)
GLUCOSE SERPL-MCNC: 103 MG/DL — SIGNIFICANT CHANGE UP (ref 70–110)
HCT VFR BLD CALC: 42.2 % — SIGNIFICANT CHANGE UP (ref 37–47)
HGB BLD-MCNC: 14 G/DL — SIGNIFICANT CHANGE UP (ref 14–18)
IMM GRANULOCYTES NFR BLD AUTO: 0.3 % — SIGNIFICANT CHANGE UP (ref 0.1–0.3)
LYMPHOCYTES # BLD AUTO: 1.37 K/UL — SIGNIFICANT CHANGE UP (ref 1.2–3.4)
LYMPHOCYTES # BLD AUTO: 21 % — SIGNIFICANT CHANGE UP (ref 20.5–51.1)
MCHC RBC-ENTMCNC: 31.3 PG — HIGH (ref 27–31)
MCHC RBC-ENTMCNC: 33.2 G/DL — SIGNIFICANT CHANGE UP (ref 32–37)
MCV RBC AUTO: 94.2 FL — HIGH (ref 81–91)
MONOCYTES # BLD AUTO: 0.81 K/UL — HIGH (ref 0.1–0.6)
MONOCYTES NFR BLD AUTO: 12.4 % — HIGH (ref 1.7–9.3)
NEUTROPHILS # BLD AUTO: 4.21 K/UL — SIGNIFICANT CHANGE UP (ref 1.4–6.5)
NEUTROPHILS NFR BLD AUTO: 64.4 % — SIGNIFICANT CHANGE UP (ref 42.2–75.2)
PLATELET # BLD AUTO: 143 K/UL — SIGNIFICANT CHANGE UP (ref 130–400)
POTASSIUM SERPL-MCNC: 3.6 MMOL/L — SIGNIFICANT CHANGE UP (ref 3.5–5)
POTASSIUM SERPL-SCNC: 3.6 MMOL/L — SIGNIFICANT CHANGE UP (ref 3.5–5)
RBC # BLD: 4.48 M/UL — SIGNIFICANT CHANGE UP (ref 4.2–5.4)
RBC # FLD: 12.7 % — SIGNIFICANT CHANGE UP (ref 11.5–14.5)
SODIUM SERPL-SCNC: 138 MMOL/L — SIGNIFICANT CHANGE UP (ref 135–146)
TSH SERPL-MCNC: 8.06 UIU/ML — HIGH (ref 0.27–4.2)
WBC # BLD: 6.53 K/UL — SIGNIFICANT CHANGE UP (ref 4.8–10.8)
WBC # FLD AUTO: 6.53 K/UL — SIGNIFICANT CHANGE UP (ref 4.8–10.8)

## 2018-02-13 RX ORDER — DRONEDARONE 400 MG/1
1 TABLET, FILM COATED ORAL
Qty: 0 | Refills: 0 | DISCHARGE
Start: 2018-02-13

## 2018-02-13 RX ORDER — APIXABAN 2.5 MG/1
1 TABLET, FILM COATED ORAL
Qty: 60 | Refills: 0
Start: 2018-02-13 | End: 2018-03-14

## 2018-02-13 RX ORDER — FUROSEMIDE 40 MG
1 TABLET ORAL
Qty: 0 | Refills: 0 | DISCHARGE
Start: 2018-02-13

## 2018-02-13 RX ORDER — DRONEDARONE 400 MG/1
1 TABLET, FILM COATED ORAL
Qty: 60 | Refills: 0
Start: 2018-02-13 | End: 2018-03-14

## 2018-02-13 RX ORDER — FUROSEMIDE 40 MG
1 TABLET ORAL
Qty: 30 | Refills: 0 | OUTPATIENT
Start: 2018-02-13 | End: 2018-03-14

## 2018-02-13 RX ADMIN — Medication 81 MILLIGRAM(S): at 13:40

## 2018-02-13 RX ADMIN — DRONEDARONE 400 MILLIGRAM(S): 400 TABLET, FILM COATED ORAL at 06:39

## 2018-02-13 RX ADMIN — Medication 50 MICROGRAM(S): at 06:39

## 2018-02-13 RX ADMIN — APIXABAN 5 MILLIGRAM(S): 2.5 TABLET, FILM COATED ORAL at 06:39

## 2018-02-13 RX ADMIN — APIXABAN 5 MILLIGRAM(S): 2.5 TABLET, FILM COATED ORAL at 19:09

## 2018-02-13 RX ADMIN — Medication 40 MILLIGRAM(S): at 06:39

## 2018-02-13 RX ADMIN — DRONEDARONE 400 MILLIGRAM(S): 400 TABLET, FILM COATED ORAL at 19:10

## 2018-02-13 NOTE — DISCHARGE NOTE ADULT - CARE PLAN
Principal Discharge DX:	Atrial fibrillation with RVR  Goal:	converting to sinus  Assessment and plan of treatment:	continue with meds as prescribed and follow up with dr owens

## 2018-02-13 NOTE — PROGRESS NOTE ADULT - SUBJECTIVE AND OBJECTIVE BOX
Patient is a 67y old  Female who presents with a chief complaint of shortness of breath (12 Feb 2018 01:30)      Patient seen and examined. no complaints.     Overnight events: didn't revert on multaq    REVIEW OF SYSTEMS:    CONSTITUTIONAL: No weakness, fevers or chills  RESPIRATORY: No cough, wheezing, hemoptysis; No shortness of breath  CARDIOVASCULAR: palpitations  GASTROINTESTINAL: No abdominal or epigastric pain.        PAST MEDICAL & SURGICAL HISTORY:  Adult hypothyroidism  HTN (hypertension)  Atrial fibrillation, unspecified type  Status post tonsillectomy  History of dilatation and curettage      Fructose (Other)  No Known Drug Allergies      MEDICATIONS  (STANDING):  apixaban 5 milliGRAM(s) Oral every 12 hours  aspirin enteric coated 81 milliGRAM(s) Oral daily  diltiazem    Tablet 90 milliGRAM(s) Oral every 8 hours  dronedarone 400 milliGRAM(s) Oral every 12 hours  furosemide    Tablet 40 milliGRAM(s) Oral daily  levothyroxine 50 MICROGram(s) Oral daily      I&O's Summary    12 Feb 2018 07:01  -  13 Feb 2018 07:00  --------------------------------------------------------  IN: 50 mL / OUT: 2350 mL / NET: -2300 mL    13 Feb 2018 07:01  -  13 Feb 2018 15:18  --------------------------------------------------------  IN: 350 mL / OUT: 950 mL / NET: -600 mL        T(C): 36.7 (02-13-18 @ 05:49), Max: 36.7 (02-13-18 @ 05:49)  HR: 112 (02-13-18 @ 05:49) (103 - 112)  BP: 140/92 (02-13-18 @ 13:47) (123/89 - 140/92)  RR: 18 (02-13-18 @ 05:49) (18 - 18)  SpO2: --    PHYSICAL EXAM:    GENERAL: NAD, well-developed  CHEST/LUNG: Clear to auscultation bilaterally; No wheeze  HEART: irregular  ABDOMEN: Soft, Nontender, Nondistended; Bowel sounds present  EXTREMITIES:  2+ Peripheral Pulses, No clubbing, cyanosis, or edema  NEUROLOGY: non-focal    Labs:                        14.0   6.53  )-----------( 143      ( 13 Feb 2018 06:44 )             42.2             02-13    138  |  101  |  14  ----------------------------<  103  3.6   |  29  |  0.9    Ca    8.8      13 Feb 2018 06:44  Mg     1.9     02-12                CARDIAC MARKERS ( 12 Feb 2018 04:47 )  0.02 ng/mL / x     / 72 U/L / x     / 3.9 ng/mL  CARDIAC MARKERS ( 12 Feb 2018 01:26 )  0.02 ng/mL / x     / 63 U/L / x     / 3.5 ng/mL      Imaging:      Transthoracic Echocardiogram (02.12.18 @ 15:13) >  1. Moderately decreased global left ventricular systolic function.   2. EF estimate 30%.   3. Thickening of the anterior mitral valve leaflet.

## 2018-02-13 NOTE — DISCHARGE NOTE ADULT - MEDICATION SUMMARY - MEDICATIONS TO STOP TAKING
I will STOP taking the medications listed below when I get home from the hospital:    furosemide 40 mg oral tablet  -- 1 tab(s) by mouth once a day I will STOP taking the medications listed below when I get home from the hospital:    Multaq 400 mg oral tablet  -- 1 tab(s) by mouth every 12 hours    furosemide 40 mg oral tablet  -- 1 tab(s) by mouth once a day

## 2018-02-13 NOTE — DISCHARGE NOTE ADULT - HOSPITAL COURSE
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

## 2018-02-13 NOTE — PROGRESS NOTE ADULT - SUBJECTIVE AND OBJECTIVE BOX
I have personally seen and examined the patient.  I agree with the history and physical which I have reviewed and noted any changes below.  02-13-18 @ 17:14    PRE-OP DIAGNOSIS:    PROCEDURE:    Physician:  MD  Assistant:  MD    ANESTHESIA TYPE:  [  ]General Anesthesia  [ X] Sedation  [ X] Local/Regional    ESTIMATED BLOOD LOSS:       mL    CONDITION  [  ] Critical  [  ] Serious  [  ]Fair  [ X]Good    Specimens removed: none    IMplants: none    Complications: none      FINDINGS    Preliminary Findings:  CINDY: Left atrial appendage was clear of clot mild smoke mild decreased fuinction   LV: LVEF was estimated at 30-35   MV: 1+ MR, No evidence for MS.   AV: Trace  AI, no evidence for AS.   TV: trace TR.   IAS: no PFO. NO R-> L shunt.   There was no atheroma seen in the thoracic aorta.     Patient succesfully converted to sinus rhythm with synchronized  360___ J of direct current cardioversion.    Final report to follow.      POST-OP DIAGNOSIS NSR   Bedside done EF 40-45% marked improvement           PLAN OF CARE  [x D/C Home today f/u 1 week continue multaq and eliquis   [ ]  D/C in AM  [ ] Return to In-patient bed

## 2018-02-13 NOTE — PROGRESS NOTE ADULT - ASSESSMENT
67 y.o female patient with PMH of atrial fibrillation presents with chest pain and shortness of breath on exertion, found to have a.fib with rvr.      1. Atrial fibrillation with RVR.  - CHADSVASC 3  -didn't revert on multaq added on 2/12  -electric cardioversion today   -eliquis for anticoagulation  - seen by Dr. Suarez  - Check TSH      2. 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   - pending TSH  - Continue Levothyroxine 50mcg q24h for now.     4- dvt prophylaxis with eliquis

## 2018-02-13 NOTE — PROGRESS NOTE ADULT - SUBJECTIVE AND OBJECTIVE BOX
SUBJ:  67-yo female with persistent A-fib. On Eliquis since admission, Multaq added yesterday.    MEDICATIONS  (STANDING):  apixaban 5 milliGRAM(s) Oral every 12 hours  aspirin enteric coated 81 milliGRAM(s) Oral daily  diltiazem    Tablet 90 milliGRAM(s) Oral every 8 hours  dronedarone 400 milliGRAM(s) Oral every 12 hours  furosemide    Tablet 40 milliGRAM(s) Oral daily  levothyroxine 50 MICROGram(s) Oral daily    MEDICATIONS  (PRN):            Vital Signs Last 24 Hrs  T(C): 36.7 (13 Feb 2018 05:49), Max: 36.7 (13 Feb 2018 05:49)  T(F): 98 (13 Feb 2018 05:49), Max: 98 (13 Feb 2018 05:49)  HR: 112 (13 Feb 2018 05:49) (83 - 112)  BP: 136/87 (13 Feb 2018 05:49) (115/68 - 136/87)  BP(mean): --  RR: 18 (13 Feb 2018 05:49) (18 - 19)  SpO2: --    REVIEW OF SYSTEMS:  CONSTITUTIONAL: No fever, weight loss, or fatigue  PAtient denies chest pain, shortness of breath or syncopal episodes.       PHYSICAL EXAM:  · CONSTITUTIONAL:	Well-developed, well nourished    BMI-  · RESPIRATORY:   airway patent; breath sounds equal; good air movement; respirations non-labored; clear to auscultation bilaterally; no chest wall tenderness; no intercostal retractions; no rales, rhonchi or wheeze  · CARDIOVASCULAR	irregular rhythm,  no rub,  no murmur   · EXTREMITIES: no cyanosis, clubbing or edema  	  TELEMETRY: A-fib, -110/min.    ECG:    TTE:    LABS:                        14.0   6.53  )-----------( 143      ( 13 Feb 2018 06:44 )             42.2     02-13    138  |  101  |  14  ----------------------------<  103  3.6   |  29  |  0.9    Ca    8.8      13 Feb 2018 06:44  Mg     1.9     02-12      CARDIAC MARKERS ( 12 Feb 2018 04:47 )  0.02 ng/mL / x     / 72 U/L / x     / 3.9 ng/mL  CARDIAC MARKERS ( 12 Feb 2018 01:26 )  0.02 ng/mL / x     / 63 U/L / x     / 3.5 ng/mL          I&O's Summary    12 Feb 2018 07:01  -  13 Feb 2018 07:00  --------------------------------------------------------  IN: 50 mL / OUT: 2350 mL / NET: -2300 mL      IMPRESSION AND PLAN:  Persistent A-fib.  [id number] Vasc score 2 (although BP has been borderline as well).    Recommend: ZAHRA/CV today.  Continue Multaq/Eliquis.  Continue PO Cardizem for now (reevaluate after cardioversion).

## 2018-02-13 NOTE — DISCHARGE NOTE ADULT - PATIENT PORTAL LINK FT
You can access the [facility] Patient Portal, offered by [facility], by registering with the following website: [email or web address]

## 2018-02-13 NOTE — DISCHARGE NOTE ADULT - CARE PROVIDER_API CALL
Ferdinand Suarez), Cardiovascular Disease; Nuclear Cardiology  [address] [place] [address]  Phone: [phone]  Fax: [phone]

## 2018-02-13 NOTE — PROGRESS NOTE ADULT - ASSESSMENT
< from: Transthoracic Echocardiogram (02.12.18 @ 15:13) >  . Moderately decreased global left ventricular systolic function.   2. EF estimate 30%.   3. Thickening of the anterior mitral valve leaflet.    < end of copied text >  MEDICATIONS  (STANDING):  apixaban 5 milliGRAM(s) Oral every 12 hours  aspirin enteric coated 81 milliGRAM(s) Oral daily  diltiazem    Tablet 90 milliGRAM(s) Oral every 8 hours  dronedarone 400 milliGRAM(s) Oral every 12 hours  furosemide    Tablet 40 milliGRAM(s) Oral daily  levothyroxine 50 MICROGram(s) Oral daily

## 2018-02-13 NOTE — PROGRESS NOTE ADULT - SUBJECTIVE AND OBJECTIVE BOX
Patient seen and examined independently agree with plan of resident.      VITAL SIGNS (Last 24 hrs):  T(C): 36.7 (02-13-18 @ 05:49), Max: 36.7 (02-13-18 @ 05:49)  HR: 112 (02-13-18 @ 05:49) (103 - 112)  BP: 140/92 (02-13-18 @ 13:47) (123/89 - 140/92)  RR: 18 (02-13-18 @ 05:49) (18 - 18)  SpO2: --  Wt(kg): --  Daily     Daily     I&O's Summary    12 Feb 2018 07:01  -  13 Feb 2018 07:00  --------------------------------------------------------  IN: 50 mL / OUT: 2350 mL / NET: -2300 mL    13 Feb 2018 07:01  -  13 Feb 2018 15:04  --------------------------------------------------------  IN: 350 mL / OUT: 950 mL / NET: -600 mL                          14.0   6.53  )-----------( 143      ( 13 Feb 2018 06:44 )             42.2   02-13    138  |  101  |  14  ----------------------------<  103  3.6   |  29  |  0.9    Ca    8.8      13 Feb 2018 06:44  Mg     1.9     02-12    CARDIAC MARKERS ( 12 Feb 2018 04:47 )  0.02 ng/mL / x     / 72 U/L / x     / 3.9 ng/mL  CARDIAC MARKERS ( 12 Feb 2018 01:26 )  0.02 ng/mL / x     / 63 U/L / x     / 3.5 ng/mL

## 2018-02-13 NOTE — DISCHARGE NOTE ADULT - MEDICATION SUMMARY - MEDICATIONS TO TAKE
I will START or STAY ON the medications listed below when I get home from the hospital:    aspirin 81 mg oral delayed release tablet  -- 1 tab(s) by mouth once a day  -- Indication: For Acute congestive heart failure, unspecified congestive heart failure type    Multaq 400 mg oral tablet  -- 1 tab(s) by mouth 2 times a day   -- Avoid grapefruit and grapefruit juice while taking this medication.  Do not take this drug if you are pregnant.  Obtain medical advice before taking any non-prescription drugs as some may affect the action of this medication.  Take with food.    -- Indication: For Atrial fibrillation, unspecified type    Multaq 400 mg oral tablet  -- 1 tab(s) by mouth every 12 hours  -- Indication: For Atrial fibrillation with RVR    Eliquis 5 mg oral tablet  -- 1 tab(s) by mouth 2 times a day   -- Check with your doctor before becoming pregnant.  It is very important that you take or use this exactly as directed.  Do not skip doses or discontinue unless directed by your doctor.  Obtain medical advice before taking any non-prescription drugs as some may affect the action of this medication.    -- Indication: For Atrial fibrillation with RVR    furosemide 40 mg oral tablet  -- 1 tab(s) by mouth once a day  -- Indication: For Acute congestive heart failure, unspecified congestive heart failure type    furosemide 40 mg oral tablet  -- 1 tab(s) by mouth once a day  -- Indication: For Acute congestive heart failure, unspecified congestive heart failure type    levothyroxine 50 mcg (0.05 mg) oral tablet  -- 1 tab(s) by mouth once a day  -- Indication: For Adult hypothyroidism

## 2018-02-13 NOTE — PRE-ANESTHESIA EVALUATION ADULT - NSATTENDATTESTRD_GEN_ALL_CORE
The patient has been re-examined and I agree with the above assessment or I updated with my findings.
The patient has been re-examined and I agree with the above assessment or I updated with my findings.

## 2018-02-16 DIAGNOSIS — I50.21 ACUTE SYSTOLIC (CONGESTIVE) HEART FAILURE: ICD-10-CM

## 2018-02-16 DIAGNOSIS — Z79.52 LONG TERM (CURRENT) USE OF SYSTEMIC STEROIDS: ICD-10-CM

## 2018-02-16 DIAGNOSIS — G72.89 OTHER SPECIFIED MYOPATHIES: ICD-10-CM

## 2018-02-16 DIAGNOSIS — I48.91 UNSPECIFIED ATRIAL FIBRILLATION: ICD-10-CM

## 2018-02-16 DIAGNOSIS — I48.1 PERSISTENT ATRIAL FIBRILLATION: ICD-10-CM

## 2018-02-16 DIAGNOSIS — E03.9 HYPOTHYROIDISM, UNSPECIFIED: ICD-10-CM

## 2018-03-08 ENCOUNTER — APPOINTMENT (OUTPATIENT)
Dept: OBGYN | Facility: CLINIC | Age: 68
End: 2018-03-08

## 2018-03-29 ENCOUNTER — OUTPATIENT (OUTPATIENT)
Dept: OUTPATIENT SERVICES | Facility: HOSPITAL | Age: 68
LOS: 1 days | Discharge: HOME | End: 2018-03-29

## 2018-03-29 ENCOUNTER — APPOINTMENT (OUTPATIENT)
Dept: OBGYN | Facility: CLINIC | Age: 68
End: 2018-03-29

## 2018-03-29 VITALS — SYSTOLIC BLOOD PRESSURE: 154 MMHG | WEIGHT: 200 LBS | BODY MASS INDEX: 30.41 KG/M2 | DIASTOLIC BLOOD PRESSURE: 80 MMHG

## 2018-03-29 DIAGNOSIS — Z98.890 OTHER SPECIFIED POSTPROCEDURAL STATES: Chronic | ICD-10-CM

## 2018-03-29 DIAGNOSIS — Z90.89 ACQUIRED ABSENCE OF OTHER ORGANS: Chronic | ICD-10-CM

## 2018-03-29 PROBLEM — E03.9 HYPOTHYROIDISM, UNSPECIFIED: Chronic | Status: ACTIVE | Noted: 2018-02-11

## 2018-03-29 PROBLEM — I10 ESSENTIAL (PRIMARY) HYPERTENSION: Chronic | Status: ACTIVE | Noted: 2018-02-11

## 2018-03-30 DIAGNOSIS — Z01.419 ENCOUNTER FOR GYNECOLOGICAL EXAMINATION (GENERAL) (ROUTINE) WITHOUT ABNORMAL FINDINGS: ICD-10-CM

## 2018-04-12 NOTE — ED PROVIDER NOTE - NS ED ATTENDING STATEMENT MOD
I have personally seen and examined this patient.  I have fully participated in the care of this patient. I have reviewed all pertinent clinical information, including history, physical exam, plan and the Resident’s note and agree except as noted. Alzheimer's dementia with behavioral disturbance, unspecified timing of dementia onset

## 2018-09-27 ENCOUNTER — APPOINTMENT (OUTPATIENT)
Dept: OBGYN | Facility: CLINIC | Age: 68
End: 2018-09-27

## 2018-09-27 ENCOUNTER — LABORATORY RESULT (OUTPATIENT)
Age: 68
End: 2018-09-27

## 2018-09-27 ENCOUNTER — OUTPATIENT (OUTPATIENT)
Dept: OUTPATIENT SERVICES | Facility: HOSPITAL | Age: 68
LOS: 1 days | Discharge: HOME | End: 2018-09-27

## 2018-09-27 VITALS
SYSTOLIC BLOOD PRESSURE: 140 MMHG | HEIGHT: 68 IN | WEIGHT: 210.25 LBS | DIASTOLIC BLOOD PRESSURE: 70 MMHG | BODY MASS INDEX: 31.87 KG/M2

## 2018-09-27 DIAGNOSIS — Z98.890 OTHER SPECIFIED POSTPROCEDURAL STATES: Chronic | ICD-10-CM

## 2018-09-27 DIAGNOSIS — Z90.89 ACQUIRED ABSENCE OF OTHER ORGANS: Chronic | ICD-10-CM

## 2018-09-28 DIAGNOSIS — N85.01 BENIGN ENDOMETRIAL HYPERPLASIA: ICD-10-CM

## 2019-03-25 ENCOUNTER — TRANSCRIPTION ENCOUNTER (OUTPATIENT)
Age: 69
End: 2019-03-25

## 2019-03-28 ENCOUNTER — APPOINTMENT (OUTPATIENT)
Dept: OBGYN | Facility: CLINIC | Age: 69
End: 2019-03-28

## 2019-03-28 VITALS
WEIGHT: 221.02 LBS | HEIGHT: 68 IN | BODY MASS INDEX: 33.5 KG/M2 | DIASTOLIC BLOOD PRESSURE: 70 MMHG | SYSTOLIC BLOOD PRESSURE: 138 MMHG

## 2019-05-13 ENCOUNTER — OUTPATIENT (OUTPATIENT)
Dept: OUTPATIENT SERVICES | Facility: HOSPITAL | Age: 69
LOS: 1 days | Discharge: HOME | End: 2019-05-13

## 2019-05-13 ENCOUNTER — APPOINTMENT (OUTPATIENT)
Dept: OBGYN | Facility: CLINIC | Age: 69
End: 2019-05-13
Payer: MEDICARE

## 2019-05-13 VITALS
HEIGHT: 68 IN | DIASTOLIC BLOOD PRESSURE: 82 MMHG | WEIGHT: 224 LBS | SYSTOLIC BLOOD PRESSURE: 122 MMHG | BODY MASS INDEX: 33.95 KG/M2

## 2019-05-13 DIAGNOSIS — Z97.5 PRESENCE OF (INTRAUTERINE) CONTRACEPTIVE DEVICE: ICD-10-CM

## 2019-05-13 DIAGNOSIS — Z98.890 OTHER SPECIFIED POSTPROCEDURAL STATES: Chronic | ICD-10-CM

## 2019-05-13 DIAGNOSIS — Z01.419 ENCOUNTER FOR GYNECOLOGICAL EXAMINATION (GENERAL) (ROUTINE) W/OUT ABNORMAL FINDINGS: ICD-10-CM

## 2019-05-13 DIAGNOSIS — Z90.89 ACQUIRED ABSENCE OF OTHER ORGANS: Chronic | ICD-10-CM

## 2019-05-13 PROCEDURE — G0101: CPT

## 2019-05-13 NOTE — PHYSICAL EXAM
[Awake] : awake [Acute Distress] : no acute distress [Alert] : alert [Nipple Discharge] : no nipple discharge [Mass] : no breast mass [Soft] : soft [Axillary LAD] : no axillary lymphadenopathy [Oriented x3] : oriented to person, place, and time [Tender] : non tender [Labia Majora] : labia major [Labia Minora] : labia minora [No Bleeding] : there was no active vaginal bleeding [Normal] : clitoris [IUD String] : had an IUD string protruding out [No Tenderness] : no rectal tenderness [Uterine Adnexae] : were not tender and not enlarged [Nl Sphincter Tone] : normal sphincter tone

## 2019-05-13 NOTE — CHIEF COMPLAINT
[Annual Visit] : annual visit [FreeTextEntry1] : Pt here for annual exam. Pt denies pmb or any gyn issues.

## 2019-05-13 NOTE — HISTORY OF PRESENT ILLNESS
[Last Pap ___] : Last cervical pap smear was [unfilled] [Postmenopausal] : is postmenopausal [de-identified] : up to date [Currently In Menopause] : currently in menopause [Experiencing Menopausal Sxs] : not experiencing menopausal symptoms [Sexually Active] : is not sexually active

## 2019-05-15 DIAGNOSIS — Z01.419 ENCOUNTER FOR GYNECOLOGICAL EXAMINATION (GENERAL) (ROUTINE) WITHOUT ABNORMAL FINDINGS: ICD-10-CM

## 2019-06-04 ENCOUNTER — APPOINTMENT (OUTPATIENT)
Dept: CARDIOLOGY | Facility: CLINIC | Age: 69
End: 2019-06-04
Payer: MEDICARE

## 2019-06-04 PROCEDURE — 93000 ELECTROCARDIOGRAM COMPLETE: CPT

## 2019-06-04 PROCEDURE — 99214 OFFICE O/P EST MOD 30 MIN: CPT

## 2019-07-16 ENCOUNTER — LABORATORY RESULT (OUTPATIENT)
Age: 69
End: 2019-07-16

## 2019-07-16 ENCOUNTER — APPOINTMENT (OUTPATIENT)
Dept: CARDIOLOGY | Facility: CLINIC | Age: 69
End: 2019-07-16
Payer: MEDICARE

## 2019-07-16 ENCOUNTER — OUTPATIENT (OUTPATIENT)
Dept: OUTPATIENT SERVICES | Facility: HOSPITAL | Age: 69
LOS: 1 days | Discharge: HOME | End: 2019-07-16

## 2019-07-16 DIAGNOSIS — E78.00 PURE HYPERCHOLESTEROLEMIA, UNSPECIFIED: ICD-10-CM

## 2019-07-16 DIAGNOSIS — I25.10 ATHEROSCLEROTIC HEART DISEASE OF NATIVE CORONARY ARTERY WITHOUT ANGINA PECTORIS: ICD-10-CM

## 2019-07-16 DIAGNOSIS — Z90.89 ACQUIRED ABSENCE OF OTHER ORGANS: Chronic | ICD-10-CM

## 2019-07-16 DIAGNOSIS — E05.90 THYROTOXICOSIS, UNSPECIFIED WITHOUT THYROTOXIC CRISIS OR STORM: ICD-10-CM

## 2019-07-16 DIAGNOSIS — Z01.810 ENCOUNTER FOR PREPROCEDURAL CARDIOVASCULAR EXAMINATION: ICD-10-CM

## 2019-07-16 DIAGNOSIS — Z98.890 OTHER SPECIFIED POSTPROCEDURAL STATES: Chronic | ICD-10-CM

## 2019-07-16 PROCEDURE — 93000 ELECTROCARDIOGRAM COMPLETE: CPT

## 2019-07-16 PROCEDURE — 99214 OFFICE O/P EST MOD 30 MIN: CPT | Mod: 25

## 2019-07-19 ENCOUNTER — APPOINTMENT (OUTPATIENT)
Dept: CARDIOLOGY | Facility: CLINIC | Age: 69
End: 2019-07-19
Payer: MEDICARE

## 2019-07-19 PROCEDURE — 99214 OFFICE O/P EST MOD 30 MIN: CPT

## 2019-07-19 PROCEDURE — 93000 ELECTROCARDIOGRAM COMPLETE: CPT

## 2019-07-22 ENCOUNTER — OUTPATIENT (OUTPATIENT)
Dept: OUTPATIENT SERVICES | Facility: HOSPITAL | Age: 69
LOS: 1 days | Discharge: HOME | End: 2019-07-22

## 2019-07-22 DIAGNOSIS — Z98.890 OTHER SPECIFIED POSTPROCEDURAL STATES: Chronic | ICD-10-CM

## 2019-07-22 DIAGNOSIS — I48.91 UNSPECIFIED ATRIAL FIBRILLATION: ICD-10-CM

## 2019-07-22 DIAGNOSIS — Z02.9 ENCOUNTER FOR ADMINISTRATIVE EXAMINATIONS, UNSPECIFIED: ICD-10-CM

## 2019-07-22 DIAGNOSIS — Z90.89 ACQUIRED ABSENCE OF OTHER ORGANS: Chronic | ICD-10-CM

## 2019-11-08 ENCOUNTER — APPOINTMENT (OUTPATIENT)
Dept: CARDIOLOGY | Facility: CLINIC | Age: 69
End: 2019-11-08
Payer: MEDICARE

## 2019-11-08 PROCEDURE — 93306 TTE W/DOPPLER COMPLETE: CPT

## 2019-11-18 ENCOUNTER — OUTPATIENT (OUTPATIENT)
Dept: OUTPATIENT SERVICES | Facility: HOSPITAL | Age: 69
LOS: 1 days | Discharge: HOME | End: 2019-11-18

## 2019-11-18 ENCOUNTER — APPOINTMENT (OUTPATIENT)
Dept: OBGYN | Facility: CLINIC | Age: 69
End: 2019-11-18
Payer: MEDICARE

## 2019-11-18 ENCOUNTER — LABORATORY RESULT (OUTPATIENT)
Age: 69
End: 2019-11-18

## 2019-11-18 VITALS — WEIGHT: 231 LBS | BODY MASS INDEX: 35.12 KG/M2 | SYSTOLIC BLOOD PRESSURE: 124 MMHG | DIASTOLIC BLOOD PRESSURE: 80 MMHG

## 2019-11-18 DIAGNOSIS — Z98.890 OTHER SPECIFIED POSTPROCEDURAL STATES: Chronic | ICD-10-CM

## 2019-11-18 DIAGNOSIS — Z90.89 ACQUIRED ABSENCE OF OTHER ORGANS: Chronic | ICD-10-CM

## 2019-11-18 PROCEDURE — 58100 BIOPSY OF UTERUS LINING: CPT

## 2019-11-18 NOTE — CHIEF COMPLAINT
[FreeTextEntry1] : Pt with a history of endometrial hyperplasia. Pt s/p mirena iud here for final embx. (if embx is negative today)

## 2019-11-18 NOTE — PROCEDURE
[Endometrial Biopsy] : Endometrial biopsy [F/U Hyperplasia] : follow-up for endometrial hyperplasia [Risks] : risks [Benefits] : benefits [Alternatives] : alternatives [Patient] : patient [Bleeding] : bleeding [Infection] : infection [CONSENT OBTAINED] : written consent was obtained prior to the procedure. [No Premedication] : No premedication [Easy Passage] : allowed easy passage of a uterine sound without dilation [Pipelle] : a Pipelle endometrial suction curette [Sent to Histology] : the specimen was place in buffered formalin and sent for pathlogy [Scant] : a scant [Tolerated Well] : the patient tolerated the procedure well [No Complications] : there were no complications [de-identified] : none [de-identified] : menopause

## 2019-11-21 DIAGNOSIS — N85.01 BENIGN ENDOMETRIAL HYPERPLASIA: ICD-10-CM

## 2019-11-22 ENCOUNTER — APPOINTMENT (OUTPATIENT)
Dept: CARDIOLOGY | Facility: CLINIC | Age: 69
End: 2019-11-22
Payer: MEDICARE

## 2019-11-22 PROCEDURE — 93000 ELECTROCARDIOGRAM COMPLETE: CPT

## 2019-11-22 PROCEDURE — 99214 OFFICE O/P EST MOD 30 MIN: CPT

## 2019-12-06 ENCOUNTER — APPOINTMENT (OUTPATIENT)
Dept: CARDIOLOGY | Facility: CLINIC | Age: 69
End: 2019-12-06
Payer: MEDICARE

## 2019-12-06 ENCOUNTER — APPOINTMENT (OUTPATIENT)
Dept: ANTEPARTUM | Facility: CLINIC | Age: 69
End: 2019-12-06
Payer: MEDICARE

## 2019-12-06 ENCOUNTER — ASOB RESULT (OUTPATIENT)
Age: 69
End: 2019-12-06

## 2019-12-06 PROCEDURE — 76830 TRANSVAGINAL US NON-OB: CPT | Mod: 26

## 2019-12-06 PROCEDURE — 99214 OFFICE O/P EST MOD 30 MIN: CPT

## 2019-12-06 PROCEDURE — 93000 ELECTROCARDIOGRAM COMPLETE: CPT

## 2019-12-09 ENCOUNTER — LABORATORY RESULT (OUTPATIENT)
Age: 69
End: 2019-12-09

## 2019-12-09 ENCOUNTER — OUTPATIENT (OUTPATIENT)
Dept: OUTPATIENT SERVICES | Facility: HOSPITAL | Age: 69
LOS: 1 days | Discharge: HOME | End: 2019-12-09

## 2019-12-09 DIAGNOSIS — Z98.890 OTHER SPECIFIED POSTPROCEDURAL STATES: Chronic | ICD-10-CM

## 2019-12-09 DIAGNOSIS — E78.00 PURE HYPERCHOLESTEROLEMIA, UNSPECIFIED: ICD-10-CM

## 2019-12-09 DIAGNOSIS — I25.10 ATHEROSCLEROTIC HEART DISEASE OF NATIVE CORONARY ARTERY WITHOUT ANGINA PECTORIS: ICD-10-CM

## 2019-12-09 DIAGNOSIS — Z90.89 ACQUIRED ABSENCE OF OTHER ORGANS: Chronic | ICD-10-CM

## 2019-12-09 DIAGNOSIS — Z01.810 ENCOUNTER FOR PREPROCEDURAL CARDIOVASCULAR EXAMINATION: ICD-10-CM

## 2019-12-09 DIAGNOSIS — E03.9 HYPOTHYROIDISM, UNSPECIFIED: ICD-10-CM

## 2019-12-10 ENCOUNTER — OUTPATIENT (OUTPATIENT)
Dept: OUTPATIENT SERVICES | Facility: HOSPITAL | Age: 69
LOS: 1 days | Discharge: HOME | End: 2019-12-10
Payer: MEDICARE

## 2019-12-10 DIAGNOSIS — Z90.89 ACQUIRED ABSENCE OF OTHER ORGANS: Chronic | ICD-10-CM

## 2019-12-10 DIAGNOSIS — Z98.890 OTHER SPECIFIED POSTPROCEDURAL STATES: Chronic | ICD-10-CM

## 2019-12-10 PROCEDURE — 93325 DOPPLER ECHO COLOR FLOW MAPG: CPT | Mod: 26

## 2019-12-10 PROCEDURE — 93320 DOPPLER ECHO COMPLETE: CPT | Mod: 26

## 2019-12-10 PROCEDURE — 93312 ECHO TRANSESOPHAGEAL: CPT | Mod: 26

## 2019-12-10 PROCEDURE — 92960 CARDIOVERSION ELECTRIC EXT: CPT

## 2019-12-10 RX ORDER — ASPIRIN/CALCIUM CARB/MAGNESIUM 324 MG
1 TABLET ORAL
Qty: 0 | Refills: 0 | DISCHARGE

## 2019-12-10 NOTE — H&P CARDIOLOGY - SOURCE

## 2019-12-10 NOTE — H&P CARDIOLOGY - HISTORY OF PRESENT ILLNESS
70 yo F h/o afib on xarelto last dose last night here for elective ZAHRA and CV 70 yo F h/o afib on xarelto last dose last night here for elective ZAHRA and CV.

## 2019-12-10 NOTE — CHART NOTE - NSCHARTNOTEFT_GEN_A_CORE
PACU ANESTHESIA ADMISSION NOTE      Procedure:   Post op diagnosis:      ____  Intubated  TV:______       Rate: ______      FiO2: ______    _x___  Patent Airway    _x___  Full return of protective reflexes    _x___  Full recovery from anesthesia / back to baseline     Vitals:   T:           R:16                  BP: 120/57                 Sat:  100                 P: 61      Mental Status:  _x___ Awake   _x____ Alert   _____ Drowsy   _____ Sedated    Nausea/Vomiting:  ____ NO  ______Yes,   See Post - Op Orders          Pain Scale (0-10):  _____    Treatment: ____ None    ____ See Post - Op/PCA Orders    Post - Operative Fluids:   ____ Oral   ____ See Post - Op Orders    Plan: Discharge: x  ____Home       _____Floor     _____Critical Care    _____  Other:_________________    Comments:

## 2019-12-10 NOTE — PROGRESS NOTE ADULT - SUBJECTIVE AND OBJECTIVE BOX
I have personally seen and examined the patient.  I agree with the history and physical which I have reviewed and noted any changes below   12-10-19 @ 09:43                                                      POST OPERATIVE PROCEDURAL DOCUMENTATION    PRE-OP DIAGNOSIS: A-fib    POST-OP DIAGNOSIS A-fib    PROCEDURE:     After risks, benefits and alternatives of the procedure were explained, consent was signded and placed in the medical record.  Procedural timeout was taken.  Sedation was administered by anesthesia.  ZAHRA probe inserted without complication and ZHARA performed.  Patient tolerated the procedure well without complication.  Post-procedure vital signs were stable.    ZAHRA findings:  LV Normal size and function  LA mildly dilated, no thrombus  RV normal size and function  RA normal size, no thrombus  MV mild A2,  P2 prolapse, mild MR  AV thickened, normal opening, trace AI  TV trace TR  IAS no PFO or ASD  Atheroma none  See full report for findings.    Cardioversion attempted:  200J  (X) successful  ( ) unsuccessful  300J  ( ) successful  ( ) unsuccessful  360J  ( ) successful  ( ) unsuccessful    Post procedure rhythm:    Recommendations:  Continue all current medications.  Patient to follow up with referring cardiologist in near future.     ANESTHESIA TYPE  [  ] General Anesthesia  [  ] Conscious Sedation  [  ] Local/Regional  [  ] Deep sedation    CONDITION  [  ] Critical  [  ] Serious  [  ] Fair  [ x ] Good

## 2019-12-11 DIAGNOSIS — I48.91 UNSPECIFIED ATRIAL FIBRILLATION: ICD-10-CM

## 2019-12-17 ENCOUNTER — APPOINTMENT (OUTPATIENT)
Dept: CARDIOLOGY | Facility: CLINIC | Age: 69
End: 2019-12-17
Payer: MEDICARE

## 2019-12-17 PROCEDURE — 99213 OFFICE O/P EST LOW 20 MIN: CPT

## 2019-12-17 PROCEDURE — 93000 ELECTROCARDIOGRAM COMPLETE: CPT

## 2019-12-30 ENCOUNTER — APPOINTMENT (OUTPATIENT)
Dept: OBGYN | Facility: CLINIC | Age: 69
End: 2019-12-30
Payer: MEDICARE

## 2019-12-30 ENCOUNTER — OUTPATIENT (OUTPATIENT)
Dept: OUTPATIENT SERVICES | Facility: HOSPITAL | Age: 69
LOS: 1 days | Discharge: HOME | End: 2019-12-30

## 2019-12-30 VITALS
DIASTOLIC BLOOD PRESSURE: 80 MMHG | HEIGHT: 68 IN | WEIGHT: 227 LBS | BODY MASS INDEX: 34.4 KG/M2 | SYSTOLIC BLOOD PRESSURE: 118 MMHG

## 2019-12-30 DIAGNOSIS — Z98.890 OTHER SPECIFIED POSTPROCEDURAL STATES: Chronic | ICD-10-CM

## 2019-12-30 DIAGNOSIS — Z90.89 ACQUIRED ABSENCE OF OTHER ORGANS: Chronic | ICD-10-CM

## 2019-12-30 PROCEDURE — 99212 OFFICE O/P EST SF 10 MIN: CPT

## 2019-12-30 NOTE — CHIEF COMPLAINT
[FreeTextEntry1] : Pt here to discuss surgical options and to get sonogram results. Pt with a long history of endometrial polyps and simple hyperplasia s/p mirena iud with repeat embxs which were all negative. Pt currently asymptomatic with no pmb

## 2019-12-31 DIAGNOSIS — N85.01 BENIGN ENDOMETRIAL HYPERPLASIA: ICD-10-CM

## 2020-01-06 ENCOUNTER — APPOINTMENT (OUTPATIENT)
Dept: OBGYN | Facility: CLINIC | Age: 70
End: 2020-01-06
Payer: MEDICARE

## 2020-01-06 ENCOUNTER — OUTPATIENT (OUTPATIENT)
Dept: OUTPATIENT SERVICES | Facility: HOSPITAL | Age: 70
LOS: 1 days | Discharge: HOME | End: 2020-01-06

## 2020-01-06 VITALS
BODY MASS INDEX: 34.86 KG/M2 | WEIGHT: 230 LBS | HEIGHT: 68 IN | SYSTOLIC BLOOD PRESSURE: 112 MMHG | DIASTOLIC BLOOD PRESSURE: 76 MMHG

## 2020-01-06 DIAGNOSIS — Z90.89 ACQUIRED ABSENCE OF OTHER ORGANS: Chronic | ICD-10-CM

## 2020-01-06 DIAGNOSIS — N85.01 BENIGN ENDOMETRIAL HYPERPLASIA: ICD-10-CM

## 2020-01-06 DIAGNOSIS — R93.89 ABNORMAL FINDINGS ON DIAGNOSTIC IMAGING OF OTHER SPECIFIED BODY STRUCTURES: ICD-10-CM

## 2020-01-06 DIAGNOSIS — Z98.890 OTHER SPECIFIED POSTPROCEDURAL STATES: Chronic | ICD-10-CM

## 2020-01-06 PROCEDURE — 99213 OFFICE O/P EST LOW 20 MIN: CPT

## 2020-01-07 PROBLEM — N85.01 SIMPLE ENDOMETRIAL HYPERPLASIA: Status: ACTIVE | Noted: 2017-03-01

## 2020-01-07 PROBLEM — R93.89 THICKENED ENDOMETRIUM: Status: ACTIVE | Noted: 2020-01-07

## 2020-01-07 NOTE — PHYSICAL EXAM
[Awake] : awake [Alert] : alert [Acute Distress] : no acute distress [Soft] : soft [Tender] : non tender [Distended] : not distended [H/Smegaly] : no hepatosplenomegaly [Scar] : no scars [de-identified] : pelvic exam previously done by dr. padron.

## 2020-01-08 DIAGNOSIS — N84.0 POLYP OF CORPUS UTERI: ICD-10-CM

## 2020-01-08 DIAGNOSIS — R93.89 ABNORMAL FINDINGS ON DIAGNOSTIC IMAGING OF OTHER SPECIFIED BODY STRUCTURES: ICD-10-CM

## 2020-01-08 DIAGNOSIS — N85.01 BENIGN ENDOMETRIAL HYPERPLASIA: ICD-10-CM

## 2020-01-09 ENCOUNTER — APPOINTMENT (OUTPATIENT)
Dept: CARDIOLOGY | Facility: CLINIC | Age: 70
End: 2020-01-09

## 2020-01-29 ENCOUNTER — APPOINTMENT (OUTPATIENT)
Dept: CARDIOLOGY | Facility: CLINIC | Age: 70
End: 2020-01-29
Payer: MEDICARE

## 2020-01-29 PROCEDURE — 93015 CV STRESS TEST SUPVJ I&R: CPT

## 2020-02-12 ENCOUNTER — EMERGENCY (EMERGENCY)
Facility: HOSPITAL | Age: 70
LOS: 0 days | Discharge: HOME | End: 2020-02-12
Admitting: EMERGENCY MEDICINE
Payer: MEDICARE

## 2020-02-12 VITALS
DIASTOLIC BLOOD PRESSURE: 65 MMHG | SYSTOLIC BLOOD PRESSURE: 140 MMHG | TEMPERATURE: 98 F | OXYGEN SATURATION: 96 % | HEART RATE: 49 BPM | RESPIRATION RATE: 18 BRPM

## 2020-02-12 DIAGNOSIS — I48.91 UNSPECIFIED ATRIAL FIBRILLATION: ICD-10-CM
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
## 2020-02-12 DIAGNOSIS — I10 ESSENTIAL (PRIMARY) HYPERTENSION: ICD-10-CM

## 2020-02-12 DIAGNOSIS — Z91.018 ALLERGY TO OTHER FOODS: ICD-10-CM

## 2020-02-12 DIAGNOSIS — M25.562 PAIN IN LEFT KNEE: ICD-10-CM
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
## 2020-02-12 DIAGNOSIS — Z98.890 OTHER SPECIFIED POSTPROCEDURAL STATES: Chronic | ICD-10-CM

## 2020-02-12 DIAGNOSIS — Z90.89 ACQUIRED ABSENCE OF OTHER ORGANS: Chronic | ICD-10-CM

## 2020-02-12 DIAGNOSIS — M25.569 PAIN IN UNSPECIFIED KNEE: ICD-10-CM
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
## 2020-02-12 PROCEDURE — 99283 EMERGENCY DEPT VISIT LOW MDM: CPT

## 2020-02-12 NOTE — ED PROVIDER NOTE - OBJECTIVE STATEMENT
70 yo F hx of A-fib on Xarelto, HTN, hypothyroid c/o left knee pain x 2 weeks after doing an exercise stress test. She went to [facility] on Friday and had xray and was  given a cortisone injection without improvement. Still with pain. No fevers.

## 2020-02-12 NOTE — ED PROVIDER NOTE - PHYSICAL EXAMINATION
Physical Exam    Vital Signs: I have reviewed the initial vital signs.  Constitutional: well-nourished, appears stated age, no acute distress  Skin: warm, dry  Muskuloskeletal: Left knee: + tender, swelling to knee. ROM intact. NO warmth, erythema or ecchymosis. Pain and limp with ambulation. n/v intact  Neuro: AOx3, No focal deficits noted

## 2020-02-12 NOTE — ED PROVIDER NOTE - PATIENT PORTAL LINK FT
You can access the FollowMyHealth Patient Portal offered by [facility] by registering at the following website: [email or web address]. By joining [employer]’s FollowMyHealth portal, you will also be able to view your health information using other applications (apps) compatible with our system.

## 2020-02-12 NOTE — ED PROVIDER NOTE - CLINICAL SUMMARY MEDICAL DECISION MAKING FREE TEXT BOX
Patient with left knee pain. She had Xray and cortisone injection at ortho [facility]. Still with pain. Ace wrap applied. Advised f/u with ortho

## 2020-02-12 NOTE — ED PROVIDER NOTE - CARE PROVIDER_API CALL
Tim Scott)  Orthopaedic Surgery  [address] [place] [address]  Phone: [phone]  Fax: [phone]  Follow Up Time: 1-3 Days

## 2020-02-12 NOTE — ED ADULT NURSE NOTE - OBJECTIVE STATEMENT
pt presents with L knee pain x1wk. Pt had an exercise stress test and after pt's knee started to hurt.

## 2020-06-02 ENCOUNTER — RESULT REVIEW (OUTPATIENT)
Age: 70
End: 2020-06-02

## 2020-06-02 ENCOUNTER — OUTPATIENT (OUTPATIENT)
Dept: OUTPATIENT SERVICES | Facility: HOSPITAL | Age: 70
LOS: 1 days | Discharge: HOME | End: 2020-06-02
Payer: MEDICARE

## 2020-06-02 VITALS
OXYGEN SATURATION: 99 % | WEIGHT: 229.94 LBS | DIASTOLIC BLOOD PRESSURE: 67 MMHG | RESPIRATION RATE: 180 BRPM | SYSTOLIC BLOOD PRESSURE: 152 MMHG | TEMPERATURE: 98 F | HEART RATE: 70 BPM | HEIGHT: 69 IN

## 2020-06-02 DIAGNOSIS — Z01.818 ENCOUNTER FOR OTHER PREPROCEDURAL EXAMINATION: ICD-10-CM

## 2020-06-02 DIAGNOSIS — N84.0 POLYP OF CORPUS UTERI: ICD-10-CM

## 2020-06-02 DIAGNOSIS — Z90.89 ACQUIRED ABSENCE OF OTHER ORGANS: Chronic | ICD-10-CM

## 2020-06-02 DIAGNOSIS — Z98.890 OTHER SPECIFIED POSTPROCEDURAL STATES: Chronic | ICD-10-CM

## 2020-06-02 DIAGNOSIS — N85.01 BENIGN ENDOMETRIAL HYPERPLASIA: ICD-10-CM

## 2020-06-02 LAB
ALBUMIN SERPL ELPH-MCNC: 4.1 G/DL — SIGNIFICANT CHANGE UP (ref 3.5–5.2)
ALP SERPL-CCNC: 98 U/L — SIGNIFICANT CHANGE UP (ref 30–115)
ALT FLD-CCNC: 17 U/L — SIGNIFICANT CHANGE UP (ref 0–41)
ANION GAP SERPL CALC-SCNC: 12 MMOL/L — SIGNIFICANT CHANGE UP (ref 7–14)
APPEARANCE UR: CLEAR — SIGNIFICANT CHANGE UP
APTT BLD: 33.2 SEC — SIGNIFICANT CHANGE UP (ref 27–39.2)
AST SERPL-CCNC: 22 U/L — SIGNIFICANT CHANGE UP (ref 0–41)
BACTERIA # UR AUTO: NEGATIVE — SIGNIFICANT CHANGE UP
BASOPHILS # BLD AUTO: 0.03 K/UL — SIGNIFICANT CHANGE UP (ref 0–0.2)
BASOPHILS NFR BLD AUTO: 0.6 % — SIGNIFICANT CHANGE UP (ref 0–1)
BILIRUB SERPL-MCNC: 0.5 MG/DL — SIGNIFICANT CHANGE UP (ref 0.2–1.2)
BILIRUB UR-MCNC: NEGATIVE — SIGNIFICANT CHANGE UP
BLD GP AB SCN SERPL QL: SIGNIFICANT CHANGE UP
BUN SERPL-MCNC: 11 MG/DL — SIGNIFICANT CHANGE UP (ref 10–20)
CALCIUM SERPL-MCNC: 9 MG/DL — SIGNIFICANT CHANGE UP (ref 8.5–10.1)
CHLORIDE SERPL-SCNC: 101 MMOL/L — SIGNIFICANT CHANGE UP (ref 98–110)
CO2 SERPL-SCNC: 28 MMOL/L — SIGNIFICANT CHANGE UP (ref 17–32)
COLOR SPEC: SIGNIFICANT CHANGE UP
CREAT SERPL-MCNC: 0.8 MG/DL — SIGNIFICANT CHANGE UP (ref 0.7–1.5)
DIFF PNL FLD: NEGATIVE — SIGNIFICANT CHANGE UP
EOSINOPHIL # BLD AUTO: 0.1 K/UL — SIGNIFICANT CHANGE UP (ref 0–0.7)
EOSINOPHIL NFR BLD AUTO: 2.1 % — SIGNIFICANT CHANGE UP (ref 0–8)
EPI CELLS # UR: 2 /HPF — SIGNIFICANT CHANGE UP (ref 0–5)
GLUCOSE SERPL-MCNC: 159 MG/DL — HIGH (ref 70–99)
GLUCOSE UR QL: NEGATIVE — SIGNIFICANT CHANGE UP
HCT VFR BLD CALC: 46.3 % — SIGNIFICANT CHANGE UP (ref 37–47)
HGB BLD-MCNC: 15.7 G/DL — SIGNIFICANT CHANGE UP (ref 12–16)
HYALINE CASTS # UR AUTO: 1 /LPF — SIGNIFICANT CHANGE UP (ref 0–7)
IMM GRANULOCYTES NFR BLD AUTO: 0.2 % — SIGNIFICANT CHANGE UP (ref 0.1–0.3)
INR BLD: 1.07 RATIO — SIGNIFICANT CHANGE UP (ref 0.65–1.3)
KETONES UR-MCNC: NEGATIVE — SIGNIFICANT CHANGE UP
LEUKOCYTE ESTERASE UR-ACNC: ABNORMAL
LYMPHOCYTES # BLD AUTO: 1.31 K/UL — SIGNIFICANT CHANGE UP (ref 1.2–3.4)
LYMPHOCYTES # BLD AUTO: 27.6 % — SIGNIFICANT CHANGE UP (ref 20.5–51.1)
MCHC RBC-ENTMCNC: 33.1 PG — HIGH (ref 27–31)
MCHC RBC-ENTMCNC: 33.9 G/DL — SIGNIFICANT CHANGE UP (ref 32–37)
MCV RBC AUTO: 97.5 FL — SIGNIFICANT CHANGE UP (ref 81–99)
MONOCYTES # BLD AUTO: 0.52 K/UL — SIGNIFICANT CHANGE UP (ref 0.1–0.6)
MONOCYTES NFR BLD AUTO: 11 % — HIGH (ref 1.7–9.3)
NEUTROPHILS # BLD AUTO: 2.77 K/UL — SIGNIFICANT CHANGE UP (ref 1.4–6.5)
NEUTROPHILS NFR BLD AUTO: 58.5 % — SIGNIFICANT CHANGE UP (ref 42.2–75.2)
NITRITE UR-MCNC: NEGATIVE — SIGNIFICANT CHANGE UP
NRBC # BLD: 0 /100 WBCS — SIGNIFICANT CHANGE UP (ref 0–0)
PH UR: 6.5 — SIGNIFICANT CHANGE UP (ref 5–8)
PLATELET # BLD AUTO: 163 K/UL — SIGNIFICANT CHANGE UP (ref 130–400)
POTASSIUM SERPL-MCNC: 4.2 MMOL/L — SIGNIFICANT CHANGE UP (ref 3.5–5)
POTASSIUM SERPL-SCNC: 4.2 MMOL/L — SIGNIFICANT CHANGE UP (ref 3.5–5)
PROT SERPL-MCNC: 6.7 G/DL — SIGNIFICANT CHANGE UP (ref 6–8)
PROT UR-MCNC: NEGATIVE — SIGNIFICANT CHANGE UP
PROTHROM AB SERPL-ACNC: 12.3 SEC — SIGNIFICANT CHANGE UP (ref 9.95–12.87)
RBC # BLD: 4.75 M/UL — SIGNIFICANT CHANGE UP (ref 4.2–5.4)
RBC # FLD: 11.9 % — SIGNIFICANT CHANGE UP (ref 11.5–14.5)
RBC CASTS # UR COMP ASSIST: 1 /HPF — SIGNIFICANT CHANGE UP (ref 0–4)
SODIUM SERPL-SCNC: 141 MMOL/L — SIGNIFICANT CHANGE UP (ref 135–146)
SP GR SPEC: 1.01 — LOW (ref 1.01–1.02)
UROBILINOGEN FLD QL: SIGNIFICANT CHANGE UP
WBC # BLD: 4.74 K/UL — LOW (ref 4.8–10.8)
WBC # FLD AUTO: 4.74 K/UL — LOW (ref 4.8–10.8)
WBC UR QL: 37 /HPF — HIGH (ref 0–5)

## 2020-06-02 PROCEDURE — 93010 ELECTROCARDIOGRAM REPORT: CPT

## 2020-06-02 PROCEDURE — 71046 X-RAY EXAM CHEST 2 VIEWS: CPT | Mod: 26

## 2020-06-02 RX ORDER — LEVOTHYROXINE SODIUM 125 MCG
1 TABLET ORAL
Qty: 0 | Refills: 0 | DISCHARGE

## 2020-06-02 RX ORDER — METOPROLOL TARTRATE 50 MG
1 TABLET ORAL
Qty: 0 | Refills: 0 | DISCHARGE

## 2020-06-02 NOTE — H&P PST ADULT - NSICDXFAMILYHX_GEN_ALL_CORE_FT
FAMILY HISTORY:  Mother  Still living? Unknown  Family history of breast cancer, Age at diagnosis: Age Unknown    Grandparent  Still living? Unknown  Family history of heart disease, Age at diagnosis: Age Unknown

## 2020-06-02 NOTE — H&P PST ADULT - NSICDXPASTMEDICALHX_GEN_ALL_CORE_FT
PAST MEDICAL HISTORY:  Adult hypothyroidism     Atrial fibrillation, unspecified type cardioversion x 2    HTN (hypertension)     Mixed connective tissue disease ??

## 2020-06-02 NOTE — H&P PST ADULT - HISTORY OF PRESENT ILLNESS
PATIENT CURRENTLY DENIES CHEST PAIN  SHORTNESS OF BREATH  PALPITATIONS,  DYSURIA, OR UPPER RESPIRATORY INFECTION IN PAST 2 WEEKS  EXERCISE  TOLERANCE  1-2 FLIGHT OF STAIRS  WITHOUT SHORTNESS OF BREATH  difficult to left knee injury   denies travel outside the [place] in the past 30 days   pt denies any covid s/s, or tested positive in the past 2 weeks

## 2020-06-02 NOTE — H&P PST ADULT - REASON FOR ADMISSION
robotic assisted total laparoscopic hysterectomy bilateral salpingooophorectomy  secondary to atypical cells from d and c

## 2020-06-03 LAB
T3 SERPL-MCNC: 105 NG/DL — SIGNIFICANT CHANGE UP (ref 80–200)
T4 AB SER-ACNC: 7.1 UG/DL — SIGNIFICANT CHANGE UP (ref 4.6–12)
TSH SERPL-MCNC: 2.96 UIU/ML — SIGNIFICANT CHANGE UP (ref 0.27–4.2)

## 2020-06-12 ENCOUNTER — APPOINTMENT (OUTPATIENT)
Dept: CARDIOLOGY | Facility: CLINIC | Age: 70
End: 2020-06-12
Payer: MEDICARE

## 2020-06-12 PROBLEM — I48.91 UNSPECIFIED ATRIAL FIBRILLATION: Chronic | Status: ACTIVE | Noted: 2018-02-11

## 2020-06-12 PROBLEM — M35.1 OTHER OVERLAP SYNDROMES: Chronic | Status: ACTIVE | Noted: 2020-06-02

## 2020-06-12 PROCEDURE — 99213 OFFICE O/P EST LOW 20 MIN: CPT

## 2020-06-12 PROCEDURE — 93000 ELECTROCARDIOGRAM COMPLETE: CPT

## 2020-06-14 ENCOUNTER — LABORATORY RESULT (OUTPATIENT)
Age: 70
End: 2020-06-14

## 2020-06-16 ENCOUNTER — RESULT REVIEW (OUTPATIENT)
Age: 70
End: 2020-06-16

## 2020-06-16 ENCOUNTER — OUTPATIENT (OUTPATIENT)
Dept: OUTPATIENT SERVICES | Facility: HOSPITAL | Age: 70
LOS: 1 days | Discharge: HOME | End: 2020-06-16
Payer: MEDICARE

## 2020-06-16 VITALS
HEART RATE: 54 BPM | HEIGHT: 69 IN | DIASTOLIC BLOOD PRESSURE: 64 MMHG | SYSTOLIC BLOOD PRESSURE: 139 MMHG | WEIGHT: 229.94 LBS | TEMPERATURE: 96 F | RESPIRATION RATE: 17 BRPM | OXYGEN SATURATION: 97 %

## 2020-06-16 VITALS — DIASTOLIC BLOOD PRESSURE: 63 MMHG | SYSTOLIC BLOOD PRESSURE: 136 MMHG | RESPIRATION RATE: 18 BRPM | HEART RATE: 46 BPM

## 2020-06-16 DIAGNOSIS — Z98.890 OTHER SPECIFIED POSTPROCEDURAL STATES: Chronic | ICD-10-CM

## 2020-06-16 DIAGNOSIS — Z90.89 ACQUIRED ABSENCE OF OTHER ORGANS: Chronic | ICD-10-CM

## 2020-06-16 PROCEDURE — 88307 TISSUE EXAM BY PATHOLOGIST: CPT | Mod: 26

## 2020-06-16 PROCEDURE — 58571 TLH W/T/O 250 G OR LESS: CPT

## 2020-06-16 RX ORDER — HEPARIN SODIUM 5000 [USP'U]/ML
5000 INJECTION INTRAVENOUS; SUBCUTANEOUS ONCE
Refills: 0 | Status: DISCONTINUED | OUTPATIENT
Start: 2020-06-16 | End: 2020-06-16

## 2020-06-16 RX ORDER — HYDROMORPHONE HYDROCHLORIDE 2 MG/ML
0.5 INJECTION INTRAMUSCULAR; INTRAVENOUS; SUBCUTANEOUS
Refills: 0 | Status: DISCONTINUED | OUTPATIENT
Start: 2020-06-16 | End: 2020-06-16

## 2020-06-16 RX ORDER — SODIUM CHLORIDE 9 MG/ML
1000 INJECTION, SOLUTION INTRAVENOUS
Refills: 0 | Status: DISCONTINUED | OUTPATIENT
Start: 2020-06-16 | End: 2020-06-16

## 2020-06-16 RX ORDER — HYDROMORPHONE HYDROCHLORIDE 2 MG/ML
1 INJECTION INTRAMUSCULAR; INTRAVENOUS; SUBCUTANEOUS
Refills: 0 | Status: DISCONTINUED | OUTPATIENT
Start: 2020-06-16 | End: 2020-06-16

## 2020-06-16 RX ORDER — DOCUSATE SODIUM 100 MG
1 CAPSULE ORAL
Qty: 30 | Refills: 0
Start: 2020-06-16 | End: 2020-06-30

## 2020-06-16 RX ORDER — ONDANSETRON 8 MG/1
4 TABLET, FILM COATED ORAL ONCE
Refills: 0 | Status: DISCONTINUED | OUTPATIENT
Start: 2020-06-16 | End: 2020-06-16

## 2020-06-16 RX ORDER — ACETAMINOPHEN 500 MG
2 TABLET ORAL
Qty: 120 | Refills: 0
Start: 2020-06-16 | End: 2020-06-30

## 2020-06-16 RX ORDER — SIMETHICONE 80 MG/1
1 TABLET, CHEWABLE ORAL
Qty: 60 | Refills: 0
Start: 2020-06-16 | End: 2020-06-30

## 2020-06-16 RX ORDER — OXYCODONE HYDROCHLORIDE 5 MG/1
1 TABLET ORAL
Qty: 15 | Refills: 0
Start: 2020-06-16

## 2020-06-16 RX ORDER — IBUPROFEN 200 MG
1 TABLET ORAL
Qty: 60 | Refills: 0
Start: 2020-06-16 | End: 2020-06-30

## 2020-06-16 RX ADMIN — SODIUM CHLORIDE 150 MILLILITER(S): 9 INJECTION, SOLUTION INTRAVENOUS at 14:04

## 2020-06-16 RX ADMIN — HYDROMORPHONE HYDROCHLORIDE 1 MILLIGRAM(S): 2 INJECTION INTRAMUSCULAR; INTRAVENOUS; SUBCUTANEOUS at 14:06

## 2020-06-16 NOTE — BRIEF OPERATIVE NOTE - NSICDXBRIEFPROCEDURE_GEN_ALL_CORE_FT
PROCEDURES:  Laparoscopic hysterectomy, total, with BSO, uterus 250 g or less 16-Jun-2020 13:13:44  Zeke Mark

## 2020-06-16 NOTE — ASU PATIENT PROFILE, ADULT - PMH
Adult hypothyroidism    Atrial fibrillation, unspecified type  cardioversion x 2  H/O Raynaud's syndrome    HTN (hypertension)    Mixed connective tissue disease  ??

## 2020-06-16 NOTE — ASU DISCHARGE PLAN (ADULT/PEDIATRIC) - ACTIVITY LEVEL
Nothing per rectum/Nothing per vagina/No heavy lifting/No intercourse/No tub baths/No tampons/No douching

## 2020-06-16 NOTE — ASU DISCHARGE PLAN (ADULT/PEDIATRIC) - CALL YOUR DOCTOR IF YOU HAVE ANY OF THE FOLLOWING:
Pain not relieved by Medications/Fever greater than (need to indicate Fahrenheit or Celsius)/Excessive diarrhea/Unable to urinate/Swelling that gets worse/Inability to tolerate liquids or foods/Wound/Surgical Site with redness, or foul smelling discharge or pus/Bleeding that does not stop/Nausea and vomiting that does not stop

## 2020-06-16 NOTE — CHART NOTE - NSCHARTNOTEFT_GEN_A_CORE
PACU ANESTHESIA ADMISSION NOTE      Procedure: Laparoscopic hysterectomy, total, with BSO, uterus 250 g or less    Post op diagnosis:  Endometrial hyperplasia      ____  Intubated  TV:______       Rate: ______      FiO2: ______    _x___  Patent Airway    _x___  Full return of protective reflexes    _x___  Full recovery from anesthesia / back to baseline status    Vitals:  T(F): 97.6  HR: 58  BP: 131/60  RR: 15  SpO2: 100%   Mental Status:  _x___ Awake   _____ Alert   _____ Drowsy   _____ Sedated    Nausea/Vomiting:  _x___  NO       ______Yes,   See Post - Op Orders         Pain Scale (0-10):  __0___    Treatment: _x___ None    ____ See Post - Op/PCA Orders    Post - Operative Fluids:   __x__ Oral   ____ See Post - Op Orders    Plan: Discharge:   _x___Home       _____Floor     _____Critical Care    _____  Other:_________________    Comments:  No anesthesia issues or complications noted.  Discharge when criteria met.

## 2020-06-16 NOTE — ASU DISCHARGE PLAN (ADULT/PEDIATRIC) - CARE PROVIDER_API CALL
Zeke Mark  Obstetrics and Gynecology  [address] [place] [id number]  Phone: [phone]  Fax: [phone]  Follow Up Time: 2 weeks

## 2020-06-17 PROBLEM — Z86.79 PERSONAL HISTORY OF OTHER DISEASES OF THE CIRCULATORY SYSTEM: Chronic | Status: ACTIVE | Noted: 2020-06-16

## 2020-06-18 NOTE — PATIENT PROFILE ADULT. - INFLUENZA IMMUNIZATION DATE:
Internal Medicine Subjective





- Subjective


Service Date: 20


Patient is:: awake


Per staff patient has:: no adverse event, no episodes of fall





Internal Medicine Objective





- Results


Result Diagrams: 


 20 09:49





 20 09:49


Recent Labs: 


 Laboratory Last Values











WBC  6.9 K/uL (4.8-10.8)   20  09:49    


 


RBC  5.34 MIL/uL (4.2-6.2)   20  09:49    


 


Hgb  13.8 gm/dL (12-16)   20  09:49    


 


Hct  43.3 % (41.0-60)   20  09:49    


 


MCV  81.1 fl ()   20  09:49    


 


MCH  25.9 pg (27.0-31.0)  L  20  09:49    


 


MCHC Differential  31.9 pg (28.0-36.0)   20  09:49    


 


RDW  15.0 % (11.5-20.0)   20  09:49    


 


Plt Count  255 Th/cmm (150-400)   20  09:49    


 


MPV  8.4 fl  20  09:49    


 


Neutrophils %  55.4 % (40.0-80.0)   20  09:49    


 


Lymphocytes %  38.9 % (20.0-50.0)   20  09:49    


 


Monocytes %  3.7 % (2.0-10.0)   20  09:49    


 


Eosinophils %  1.6 % (0.0-5.0)   20  09:49    


 


Basophils %  0.4 % (0.0-2.0)   20  09:49    


 


Sodium  141 mmol/L (136-145)   20  09:49    


 


Potassium  4.1 mmol/L (3.5-5.1)   20  09:49    


 


Chloride  105 mmol/L ()   20  09:49    


 


Carbon Dioxide  30 mmol/L (23-29)  H  20  09:49    


 


Anion Gap  10  (5-15)   20  09:49    


 


BUN  20 mg/dL (8-21)   20  09:49    


 


Creatinine  0.84 mg/dL (0.70-1.30)   20  09:49    


 


Glucose  134 mg/dL (70-99)  H  20  09:49    


 


Calcium  9.6 mg/dL (8.4-10.2)   20  09:49    


 


Total Bilirubin  0.6 mg/dL (0.0-1.0)   20  09:49    


 


AST  12 U/L (10-37)   20  09:49    


 


ALT  17 U/L (12-78)   20  09:49    


 


Alkaline Phosphatase  69 U/L ()   20  09:49    


 


Total Protein  7.2 g/dL (6.4-8.3)   20  09:49    


 


Albumin  3.7 g/dL (3.4-5.0)   20  09:49    


 


Triglycerides  216 mg/dL (<150)  H  20  12:11    


 


Cholesterol  313 mg/dL (<200)  H  20  12:11    


 


LDL Cholesterol Direct  216 mg/dL ()  H  20  12:11    


 


HDL Cholesterol  49 mg/dL (23-92)   20  12:11    














- Physical Exam


Vitals and I&O: 


 Vital Signs











Temp  97.2 F   20 14:00


 


Pulse  55   20 14:00


 


Resp  20   20 14:00


 


BP  158/76   20 14:00


 


Pulse Ox  98   20 14:00








 Intake & Output











 20





 18:59 06:59 18:59


 


Intake Total 800 240 


 


Balance 800 240 


 


Intake:   


 


  Oral 800 240 


 


Other:   


 


  # Voids 4 2 


 


  # Bowel Movements 0 0 











Active Medications: 


Current Medications





Acetaminophen (Tylenol)  650 mg PO Q4H PRN


   PRN Reason: Pain (Mild 1-3)


   Stop: 20 17:51


   Last Admin: 20 16:06 Dose:  650 mg


Al Hydrox/Mg Hydrox/Simethicone (Maalox)  30 ml PO Q4HR PRN


   PRN Reason: GI DISTRESS


   Stop: 20 17:51


Atorvastatin Calcium (Lipitor)  20 mg PO HS HOWARD


   Stop: 20 20:59


   Last Admin: 20 20:48 Dose:  20 mg


Donepezil HCl (Aricept)  5 mg PO DAILY HOWARD


   Stop: 20 08:59


   Last Admin: 20 08:42 Dose:  5 mg


Escitalopram Oxalate (Lexapro)  5 mg PO DAILY HOWARD; Protocol


   Stop: 20 08:59


   Last Admin: 20 08:42 Dose:  5 mg


Lorazepam (Ativan)  0.5 mg PO Q4HR PRN; Protocol


   PRN Reason: Anxiety


   Stop: 20 17:51


   Last Admin: 20 16:52 Dose:  0.5 mg


Losartan Potassium (Cozaar)  50 mg PO DAILY HOWARD


   Stop: 20 08:59


   Last Admin: 20 08:43 Dose:  50 mg


Magnesium Hydroxide (Milk Of Magnesia)  30 ml PO HS PRN


   PRN Reason: Constipation


Memantine (Namenda)  5 mg PO BID HOWARD


   Stop: 08/15/20 16:59


   Last Admin: 20 08:43 Dose:  5 mg


Metoprolol Tartrate (Lopressor)  50 mg PO Q12HR HOWARD


   Stop: 20 20:59


   Last Admin: 20 08:43 Dose:  50 mg


Multivitamins/Vitamin C (Theragran)  1 tab PO DAILY HOWARD


   Stop: 20 08:59


   Last Admin: 20 08:43 Dose:  1 tab


Quetiapine Fumarate (Seroquel)  12.5 mg PO HS HOWARD; Protocol


   Stop: 20 20:59


   Last Admin: 20 20:48 Dose:  12.5 mg








General: weak


HEENT: NC/AT, PERRLA


Neck: Supple, No JVD


Lungs: CTAB


Cardiovascular: RRR, Normal S1, Normal S2


Abdomen: soft, non-tender


Extremities: clear


Neurological: no change





Internal Medicine Assmt/Plan





- Assessment


Assessment: 





1.  HTN


2.  HLD


3.  Dementia





- Plan


Plan: 





continue cozaar and lopressor


BP still slightly labile


will continue to monitor


continue supportive care


reviewed medical records


d/w r.n.





Nutritional Asmnt/Malnutr-PDOC





- Dietary Evaluation


Malnutrition Findings (Please click <Entered> for more info): 








Nutritional Asmnt/Malnutrition                             Start:  20 11:

47


Text:                                                      Status: Complete    

  


Freq:                                                                          

  


Protocol:                                                                      

  


 Document     20 11:47  GRECIA  (Rec: 20 11:49  GRECIA  [id number])


 Nutritional Asmnt/Malnutrition


     Patient General Information


      Nutritional Screening                      Moderate Risk


      Diagnosis                                  Acute Psychosis associated


                                                 with Advanced Dementia


      Pertinent Medical Hx/Surgical Hx           HTN, Hyperlipidemia, Dementia


      Subjective Information                     Pt is a 82-year-old female


                                                 admitted from living by


                                                 herself on  d/t acute


                                                 psychosis associated with


                                                 advanced dementia. Pt is


                                                 eating an estimated 100% of


                                                 meals Per Meal/Nutrition


                                                 Activity Record. Dietary is


                                                 currently providing an


                                                 estimated 2150 kcals and 108


                                                 gm Pro to meet 100+% kcal and


                                                 100+% Pro needs.


                                                 Anthropometrics


                                                 HT: 53


                                                 WT: 144 LB (65.45 kg)


                                                 BMI: 25.58 (Overweight)


                                                 GI/ Skin Integrity


                                                 GI: WNL, Soft, Non-tender


                                                 BM: 6/15 x1


                                                 I/O: 1240/Not Noted


                                                 Skin: WNL, Intact


                                                 Navid: 21


                                                 Diet Order: Cardiac


                                                 Estimated Energy Needs: (


                                                 Geriatric, CBW)


                                                 3985-7146 kcals (25-30 kcals/


                                                 kg)


                                                 65-80g Pro (1.0-1.2 g/kg)


                                                 9229-0862 ml (25-30 ml/kg)


      Current Diet Order/ Nutrition Support      Cardiac


      Pertinent Medications                      Maalox, Lipitor, Valium,


                                                 Cozaar, MOM (PRN), Theragran


      Pertinent Labs                             : Glucose 134, Cholesterol


                                                 313, Triglycerides 216, LDL


                                                 216


                                                 : Glucose 119, Urine


                                                 Ketones- trace


     Nutritional Hx/Data


      Height                                     1.6 m


      Height (Calculated Centimeters)            160.0


      Current Weight (lbs)                       65.317 kg


      Weight (Calculated Kilograms)              65.3


      Weight (Calculated Grams)                  15563.3


      Ideal Body Weight                          115 LB (52.27 kg)


      % Ideal Body Weight                        125


      Body Mass Index (BMI)                      25.4


      Weight Status                              Overweight


     GI Symptoms


      Last BM                                    6/15 x1


      Skin Integrity/Comment:                    Skin: WNL, Intact


                                                 Navid: 21


      Current %PO                                Good (%)


     Estimated Nutritional Goals


      BEE in Kcals:                              Using Current wt


      Calories/Kcals/Kg                          25-30


      Kcals Calculated                           8025-8457


      Protein:                                   Using Current wt


      Protein g/k.0-1.2


      Protein Calculated                         65-80


      Fluid: ml                                  5797-5067 ml (25-30 ml/kg)


     Nutritional Problem


      No current Nutrition Prob


       Problem                                   No nutrition diagnosis at this


                                                 time.


       Etiology                                  N/A


       Signs/Symptoms:                           N/A


     Malnutrition Related to Morbid Obesity


      Malnutrition related to morbid obesity     No


     Intervention/Recommendation


      Comments                                   Continue Cardiac diet as


                                                 tolerated.


     Expected Outcomes/Goals


      Expected Outcomes/Goals                    1.PO intake to continue to


                                                 meet >75% of estimated


                                                 nutritional needs.


                                                 2.Monitor PO intake, wt,


                                                 nutrition related labs, and


                                                 skin integrity.


                                                 3.F/U as low risk in 7-10 days


                                                 , -. sept 2017

## 2020-06-23 LAB — SURGICAL PATHOLOGY STUDY: SIGNIFICANT CHANGE UP

## 2020-06-25 DIAGNOSIS — I48.91 UNSPECIFIED ATRIAL FIBRILLATION: ICD-10-CM

## 2020-06-25 DIAGNOSIS — D25.9 LEIOMYOMA OF UTERUS, UNSPECIFIED: ICD-10-CM

## 2020-06-25 DIAGNOSIS — E03.9 HYPOTHYROIDISM, UNSPECIFIED: ICD-10-CM

## 2020-06-25 DIAGNOSIS — D26.0 OTHER BENIGN NEOPLASM OF CERVIX UTERI: ICD-10-CM

## 2020-06-25 DIAGNOSIS — I10 ESSENTIAL (PRIMARY) HYPERTENSION: ICD-10-CM

## 2020-06-25 DIAGNOSIS — N83.312 ACQUIRED ATROPHY OF LEFT OVARY: ICD-10-CM

## 2020-06-25 DIAGNOSIS — Z79.01 LONG TERM (CURRENT) USE OF ANTICOAGULANTS: ICD-10-CM

## 2020-06-25 DIAGNOSIS — N83.311 ACQUIRED ATROPHY OF RIGHT OVARY: ICD-10-CM

## 2020-06-25 DIAGNOSIS — N85.01 BENIGN ENDOMETRIAL HYPERPLASIA: ICD-10-CM

## 2020-06-25 DIAGNOSIS — N88.8 OTHER SPECIFIED NONINFLAMMATORY DISORDERS OF CERVIX UTERI: ICD-10-CM

## 2020-06-29 ENCOUNTER — OUTPATIENT (OUTPATIENT)
Dept: OUTPATIENT SERVICES | Facility: HOSPITAL | Age: 70
LOS: 1 days | Discharge: HOME | End: 2020-06-29

## 2020-06-29 ENCOUNTER — APPOINTMENT (OUTPATIENT)
Dept: OBGYN | Facility: CLINIC | Age: 70
End: 2020-06-29
Payer: MEDICARE

## 2020-06-29 VITALS
BODY MASS INDEX: 35.16 KG/M2 | HEIGHT: 68 IN | DIASTOLIC BLOOD PRESSURE: 80 MMHG | WEIGHT: 232 LBS | SYSTOLIC BLOOD PRESSURE: 130 MMHG

## 2020-06-29 DIAGNOSIS — Z90.89 ACQUIRED ABSENCE OF OTHER ORGANS: Chronic | ICD-10-CM

## 2020-06-29 DIAGNOSIS — Z98.890 OTHER SPECIFIED POSTPROCEDURAL STATES: Chronic | ICD-10-CM

## 2020-06-29 PROCEDURE — 99024 POSTOP FOLLOW-UP VISIT: CPT

## 2020-07-27 ENCOUNTER — APPOINTMENT (OUTPATIENT)
Dept: OBGYN | Facility: CLINIC | Age: 70
End: 2020-07-27
Payer: MEDICARE

## 2020-07-27 ENCOUNTER — OUTPATIENT (OUTPATIENT)
Dept: OUTPATIENT SERVICES | Facility: HOSPITAL | Age: 70
LOS: 1 days | Discharge: HOME | End: 2020-07-27

## 2020-07-27 VITALS
BODY MASS INDEX: 35.4 KG/M2 | SYSTOLIC BLOOD PRESSURE: 132 MMHG | WEIGHT: 233.56 LBS | HEIGHT: 68 IN | DIASTOLIC BLOOD PRESSURE: 80 MMHG

## 2020-07-27 DIAGNOSIS — Z98.890 OTHER SPECIFIED POSTPROCEDURAL STATES: Chronic | ICD-10-CM

## 2020-07-27 DIAGNOSIS — Z90.89 ACQUIRED ABSENCE OF OTHER ORGANS: Chronic | ICD-10-CM

## 2020-07-27 DIAGNOSIS — Z09 ENCOUNTER FOR FOLLOW-UP EXAMINATION AFTER COMPLETED TREATMENT FOR CONDITIONS OTHER THAN MALIGNANT NEOPLASM: ICD-10-CM

## 2020-07-27 DIAGNOSIS — N84.0 POLYP OF CORPUS UTERI: ICD-10-CM

## 2020-07-27 PROCEDURE — 99024 POSTOP FOLLOW-UP VISIT: CPT

## 2020-07-27 NOTE — PHYSICAL EXAM
[Normal] : vagina [No Bleeding] : there was no active vaginal bleeding [Absent] : absent [Adnexa Absent] : absent bilaterally

## 2020-09-09 DIAGNOSIS — R92.8 OTHER ABNORMAL AND INCONCLUSIVE FINDINGS ON DIAGNOSTIC IMAGING OF BREAST: ICD-10-CM

## 2020-09-17 ENCOUNTER — RECORD ABSTRACTING (OUTPATIENT)
Age: 70
End: 2020-09-17

## 2020-09-17 DIAGNOSIS — Z92.89 PERSONAL HISTORY OF OTHER MEDICAL TREATMENT: ICD-10-CM

## 2020-09-17 DIAGNOSIS — Z86.79 PERSONAL HISTORY OF OTHER DISEASES OF THE CIRCULATORY SYSTEM: ICD-10-CM

## 2020-09-17 DIAGNOSIS — Z86.39 PERSONAL HISTORY OF OTHER ENDOCRINE, NUTRITIONAL AND METABOLIC DISEASE: ICD-10-CM

## 2020-09-17 DIAGNOSIS — Z82.49 FAMILY HISTORY OF ISCHEMIC HEART DISEASE AND OTHER DISEASES OF THE CIRCULATORY SYSTEM: ICD-10-CM

## 2020-10-05 PROBLEM — R92.8 ABNORMAL MAMMOGRAM: Status: ACTIVE | Noted: 2020-10-05

## 2020-10-14 ENCOUNTER — RX RENEWAL (OUTPATIENT)
Age: 70
End: 2020-10-14

## 2020-11-06 ENCOUNTER — APPOINTMENT (OUTPATIENT)
Dept: CARDIOLOGY | Facility: CLINIC | Age: 70
End: 2020-11-06
Payer: MEDICARE

## 2020-11-06 VITALS
DIASTOLIC BLOOD PRESSURE: 72 MMHG | BODY MASS INDEX: 35.61 KG/M2 | WEIGHT: 235 LBS | HEIGHT: 68 IN | HEART RATE: 55 BPM | SYSTOLIC BLOOD PRESSURE: 138 MMHG

## 2020-11-06 PROCEDURE — 99213 OFFICE O/P EST LOW 20 MIN: CPT

## 2020-11-06 PROCEDURE — 93000 ELECTROCARDIOGRAM COMPLETE: CPT

## 2020-11-06 RX ORDER — HYDROCHLOROTHIAZIDE 25 MG/1
25 TABLET ORAL DAILY
Qty: 30 | Refills: 5 | Status: DISCONTINUED | COMMUNITY
End: 2020-11-06

## 2020-11-06 RX ORDER — DRONEDARONE 400 MG/1
400 TABLET, FILM COATED ORAL TWICE DAILY
Qty: 60 | Refills: 5 | Status: DISCONTINUED | COMMUNITY
End: 2020-11-06

## 2020-11-06 NOTE — PHYSICAL EXAM
[General Appearance - Well Developed] : well developed [Normal Appearance] : normal appearance [Well Groomed] : well groomed [General Appearance - Well Nourished] : well nourished [No Deformities] : no deformities [General Appearance - In No Acute Distress] : no acute distress [Normal Conjunctiva] : the conjunctiva exhibited no abnormalities [Eyelids - No Xanthelasma] : the eyelids demonstrated no xanthelasmas [Respiration, Rhythm And Depth] : normal respiratory rhythm and effort [Exaggerated Use Of Accessory Muscles For Inspiration] : no accessory muscle use [Auscultation Breath Sounds / Voice Sounds] : lungs were clear to auscultation bilaterally [Normal Rate] : normal [Rhythm Regular] : regular [Normal S1] : normal S1 [Normal S2] : normal S2 [S4] : an S4 was heard [No Murmur] : no murmurs heard [No Pitting Edema] : no pitting edema present [Bowel Sounds] : normal bowel sounds [Abdomen Soft] : soft [Abdomen Tenderness] : non-tender [Abdomen Mass (___ Cm)] : no abdominal mass palpated [Cyanosis, Localized] : no localized cyanosis [Skin Color & Pigmentation] : normal skin color and pigmentation [] : no rash [Oriented To Time, Place, And Person] : oriented to person, place, and time [S3] : no S3

## 2020-11-06 NOTE — HISTORY OF PRESENT ILLNESS
[FreeTextEntry1] : 70-yo female with h/o HTN, persistent A-fib. S/p ZAHRA/CV at [facility] in December 2019. Denies CP, SOB, palpitations.\par \par BP has been elevated, improved with HCTZ.

## 2020-11-06 NOTE — REVIEW OF SYSTEMS
[see HPI] : see HPI [Negative] : Endocrine [Blurry Vision] : no blurred vision [Seeing Double (Diplopia)] : no diplopia [Lower Ext Edema] : no extremity edema [Leg Claudication] : no intermittent leg claudication [Palpitations] : no palpitations [Skin: A Rash] : no rash: [Anxiety] : no anxiety [Easy Bleeding] : no tendency for easy bleeding [Easy Bruising] : no tendency for easy bruising

## 2020-11-06 NOTE — ASSESSMENT
[FreeTextEntry1] : HTN, better controlled now.\par Persistent A-fib, in SR today on Flecainide.\par [id number] Vasc score 3.\par \par Plan:\par Continue treatment.\par Will schedule blood work with PMD.\par F/u in 3 months.\par \par Ferdinand Suarez MD\par

## 2021-02-03 ENCOUNTER — NON-APPOINTMENT (OUTPATIENT)
Age: 71
End: 2021-02-03

## 2021-02-26 ENCOUNTER — APPOINTMENT (OUTPATIENT)
Dept: CARDIOLOGY | Facility: CLINIC | Age: 71
End: 2021-02-26
Payer: MEDICARE

## 2021-02-26 VITALS
BODY MASS INDEX: 36.37 KG/M2 | SYSTOLIC BLOOD PRESSURE: 122 MMHG | WEIGHT: 240 LBS | HEIGHT: 68 IN | DIASTOLIC BLOOD PRESSURE: 70 MMHG

## 2021-02-26 PROCEDURE — 99213 OFFICE O/P EST LOW 20 MIN: CPT

## 2021-02-26 PROCEDURE — 93000 ELECTROCARDIOGRAM COMPLETE: CPT

## 2021-02-26 NOTE — PHYSICAL EXAM
[General Appearance - Well Developed] : well developed [Normal Appearance] : normal appearance [Well Groomed] : well groomed [General Appearance - Well Nourished] : well nourished [No Deformities] : no deformities [General Appearance - In No Acute Distress] : no acute distress [Normal Conjunctiva] : the conjunctiva exhibited no abnormalities [Eyelids - No Xanthelasma] : the eyelids demonstrated no xanthelasmas [Respiration, Rhythm And Depth] : normal respiratory rhythm and effort [Exaggerated Use Of Accessory Muscles For Inspiration] : no accessory muscle use [Auscultation Breath Sounds / Voice Sounds] : lungs were clear to auscultation bilaterally [Normal Rate] : normal [Rhythm Regular] : regular [Normal S1] : normal S1 [Normal S2] : normal S2 [S3] : no S3 [S4] : an S4 was heard [No Murmur] : no murmurs heard [No Pitting Edema] : no pitting edema present [Bowel Sounds] : normal bowel sounds [Abdomen Soft] : soft [Abdomen Tenderness] : non-tender [Abdomen Mass (___ Cm)] : no abdominal mass palpated [Cyanosis, Localized] : no localized cyanosis [Skin Color & Pigmentation] : normal skin color and pigmentation [] : no rash [Oriented To Time, Place, And Person] : oriented to person, place, and time

## 2021-02-26 NOTE — ASSESSMENT
[FreeTextEntry1] : 70-yo female.\par HTN controlled now.\par Persistent A-fib, SR maintained on Flecainide.\par CHADS Vasc score 3..\par \par Plan:\par Continue treatment.\par Blood work from PMD.\par Increase daily walking.\par F/u in 4 months.\par \par Ferdinand Suarez MD\par

## 2021-02-26 NOTE — HISTORY OF PRESENT ILLNESS
[FreeTextEntry1] : 70-yo female with h/o HTN, persistent A-fib. S/p ZAHRA/CV at [facility] in December 2019. Denies CP, SOB, palpitations.\par \par BP has been controlled.

## 2021-07-02 ENCOUNTER — APPOINTMENT (OUTPATIENT)
Dept: CARDIOLOGY | Facility: CLINIC | Age: 71
End: 2021-07-02
Payer: MEDICARE

## 2021-07-02 ENCOUNTER — RESULT CHARGE (OUTPATIENT)
Age: 71
End: 2021-07-02

## 2021-07-02 VITALS
HEIGHT: 68 IN | SYSTOLIC BLOOD PRESSURE: 125 MMHG | WEIGHT: 242 LBS | BODY MASS INDEX: 36.68 KG/M2 | DIASTOLIC BLOOD PRESSURE: 75 MMHG

## 2021-07-02 PROCEDURE — 93000 ELECTROCARDIOGRAM COMPLETE: CPT

## 2021-07-02 PROCEDURE — 99214 OFFICE O/P EST MOD 30 MIN: CPT

## 2021-07-02 NOTE — HISTORY OF PRESENT ILLNESS
[FreeTextEntry1] : 70-yo female with h/o HTN, persistent A-fib. S/p ZAHRA/CV at [facility] in December 2019. Denies CP, SOB, palpitations.\par \par BP has been controlled. HR has been slow at times, accompanied by fatigue.

## 2021-07-02 NOTE — REVIEW OF SYSTEMS
[Lower Ext Edema] : no extremity edema [Leg Claudication] : no intermittent leg claudication [Palpitations] : no palpitations [Orthopnea] : no orthopnea [Rash] : no rash [Anxiety] : no anxiety [Easy Bleeding] : no tendency for easy bleeding [Easy Bruising] : no tendency for easy bruising [Negative] : Neurological

## 2021-07-02 NOTE — PHYSICAL EXAM
[Well Developed] : well developed [Well Nourished] : well nourished [No Acute Distress] : no acute distress [Normal Conjunctiva] : normal conjunctiva [Normal Venous Pressure] : normal venous pressure [No Carotid Bruit] : no carotid bruit [Normal S1, S2] : normal S1, S2 [No Murmur] : no murmur [No Rub] : no rub [S4] : S4 [Clear Lung Fields] : clear lung fields [Good Air Entry] : good air entry [No Respiratory Distress] : no respiratory distress  [Soft] : abdomen soft [Non Tender] : non-tender [Normal Bowel Sounds] : normal bowel sounds [Normal Gait] : normal gait [No Edema] : no edema [No Cyanosis] : no cyanosis [No Clubbing] : no clubbing [No Varicosities] : no varicosities [No Rash] : no rash [Moves all extremities] : moves all extremities [Normal Speech] : normal speech [Alert and Oriented] : alert and oriented [Normal memory] : normal memory

## 2021-07-02 NOTE — ASSESSMENT
[FreeTextEntry1] : 70-yo female.\par HTN controlled now.\par Persistent A-fib, SR maintained on Flecainide.\par Sinus bradycardia today.\par CHADS Vasc score 3..\par \par Plan:\par Continue Losartan, HCTZ.\par Reduce Metoprolol to 12.5 mg bid.\par Continue Flecainide.\par Blood work from PMD (? TSH).\par Increase daily walking.\par F/u in 4 months.\par \par Ferdinand Suarez MD\par

## 2021-11-19 ENCOUNTER — RESULT CHARGE (OUTPATIENT)
Age: 71
End: 2021-11-19

## 2021-11-19 ENCOUNTER — APPOINTMENT (OUTPATIENT)
Dept: CARDIOLOGY | Facility: CLINIC | Age: 71
End: 2021-11-19
Payer: MEDICARE

## 2021-11-19 VITALS
BODY MASS INDEX: 37.28 KG/M2 | DIASTOLIC BLOOD PRESSURE: 70 MMHG | WEIGHT: 246 LBS | HEIGHT: 68 IN | SYSTOLIC BLOOD PRESSURE: 122 MMHG

## 2021-11-19 PROCEDURE — 99213 OFFICE O/P EST LOW 20 MIN: CPT

## 2021-11-19 PROCEDURE — 93000 ELECTROCARDIOGRAM COMPLETE: CPT

## 2021-11-19 NOTE — ASSESSMENT
[FreeTextEntry1] : 70-yo female.\par HTN controlled now.\par Persistent A-fib, SR maintained on Flecainide.\par Sinus bradycardia, appears asymptomatic now.\par CHADS Vasc score 3..\par \par Plan:\par Continue treatment.\par Blood work from PMD (? TSH).\par Increase daily walking.\par F/u in 6 months.\par \par Ferdinand Suarez MD\par

## 2021-11-22 ENCOUNTER — RX RENEWAL (OUTPATIENT)
Age: 71
End: 2021-11-22

## 2022-02-06 ENCOUNTER — RX RENEWAL (OUTPATIENT)
Age: 72
End: 2022-02-06

## 2022-02-18 ENCOUNTER — RX RENEWAL (OUTPATIENT)
Age: 72
End: 2022-02-18

## 2022-05-10 ENCOUNTER — APPOINTMENT (OUTPATIENT)
Dept: CARDIOLOGY | Facility: CLINIC | Age: 72
End: 2022-05-10
Payer: MEDICARE

## 2022-05-10 ENCOUNTER — RESULT CHARGE (OUTPATIENT)
Age: 72
End: 2022-05-10

## 2022-05-10 VITALS
SYSTOLIC BLOOD PRESSURE: 120 MMHG | TEMPERATURE: 97.2 F | DIASTOLIC BLOOD PRESSURE: 70 MMHG | HEIGHT: 68 IN | WEIGHT: 253 LBS | BODY MASS INDEX: 38.34 KG/M2

## 2022-05-10 PROCEDURE — 93000 ELECTROCARDIOGRAM COMPLETE: CPT

## 2022-05-10 PROCEDURE — 99214 OFFICE O/P EST MOD 30 MIN: CPT

## 2022-05-10 NOTE — ASSESSMENT
[FreeTextEntry1] : 71-yo female.\par HTN controlled now.\par Persistent A-fib, SR maintained on Flecainide.\par Progressive sinus bradycardia, still appears asymptomatic.\par CHADS Vasc score 3..\par Increased leg edema and ANGULO c/w fluid overload.\par \par Plan:\par D/c Metoprolol.\par Hold HCTZ for now.\par Start Furosemide 40 mg daily with K-dur.\par 2D ECHO.\par Blood work pending.\par Diet, weight loss discussed.\par F/u in 1 month.\par \par Ferdinand Suarez MD\par

## 2022-05-10 NOTE — PHYSICAL EXAM
[Well Developed] : well developed [No Acute Distress] : no acute distress [Normal Conjunctiva] : normal conjunctiva [Normal Venous Pressure] : normal venous pressure [No Carotid Bruit] : no carotid bruit [Normal S1, S2] : normal S1, S2 [No Murmur] : no murmur [No Rub] : no rub [S4] : S4 [Clear Lung Fields] : clear lung fields [Good Air Entry] : good air entry [No Respiratory Distress] : no respiratory distress  [Soft] : abdomen soft [Non Tender] : non-tender [Normal Bowel Sounds] : normal bowel sounds [Normal Gait] : normal gait [No Cyanosis] : no cyanosis [No Clubbing] : no clubbing [No Varicosities] : no varicosities [No Rash] : no rash [Moves all extremities] : moves all extremities [Normal Speech] : normal speech [Alert and Oriented] : alert and oriented [Normal memory] : normal memory [Obese] : obese [Edema ___] : edema [unfilled]

## 2022-05-10 NOTE — REVIEW OF SYSTEMS
[Negative] : Neurological [Dyspnea on exertion] : dyspnea during exertion [Chest Discomfort] : no chest discomfort [Lower Ext Edema] : lower extremity edema [Leg Claudication] : no intermittent leg claudication [Palpitations] : no palpitations [Orthopnea] : no orthopnea [Rash] : no rash [Anxiety] : no anxiety [Easy Bleeding] : no tendency for easy bleeding [Easy Bruising] : no tendency for easy bruising

## 2022-05-10 NOTE — CARDIOLOGY SUMMARY
[de-identified] : 05/10/22:\par SB 48/min, 1st degree AVB, RVCD, LAFB with poor R wave progression.

## 2022-05-10 NOTE — HISTORY OF PRESENT ILLNESS
[FreeTextEntry1] : 71-yo female with h/o HTN, persistent A-fib. S/p ZAHRA/CV at [facility] in December 2019. Denies CP, palpitations.\par \par BP has been controlled.\par \par Increased leg edema and ANGULO in the last 2-3 weeks. No cough.

## 2022-05-11 ENCOUNTER — RX RENEWAL (OUTPATIENT)
Age: 72
End: 2022-05-11

## 2022-06-01 ENCOUNTER — RX RENEWAL (OUTPATIENT)
Age: 72
End: 2022-06-01

## 2022-06-21 ENCOUNTER — APPOINTMENT (OUTPATIENT)
Dept: CARDIOLOGY | Facility: CLINIC | Age: 72
End: 2022-06-21
Payer: MEDICARE

## 2022-06-21 PROCEDURE — 93306 TTE W/DOPPLER COMPLETE: CPT

## 2022-06-28 ENCOUNTER — APPOINTMENT (OUTPATIENT)
Dept: CARDIOLOGY | Facility: CLINIC | Age: 72
End: 2022-06-28

## 2022-06-28 VITALS
BODY MASS INDEX: 36.37 KG/M2 | DIASTOLIC BLOOD PRESSURE: 82 MMHG | SYSTOLIC BLOOD PRESSURE: 142 MMHG | WEIGHT: 240 LBS | TEMPERATURE: 97.2 F | HEIGHT: 68 IN

## 2022-06-28 DIAGNOSIS — I50.33 ACUTE ON CHRONIC DIASTOLIC (CONGESTIVE) HEART FAILURE: ICD-10-CM

## 2022-06-28 PROCEDURE — 93000 ELECTROCARDIOGRAM COMPLETE: CPT

## 2022-06-28 PROCEDURE — 99214 OFFICE O/P EST MOD 30 MIN: CPT

## 2022-06-28 NOTE — HISTORY OF PRESENT ILLNESS
[FreeTextEntry1] : 71-yo female with h/o HTN, persistent A-fib. S/p ZAHRA/CV at [facility] in December 2019. \par \par Patient presents for F/U cardiology visit. BP has been controlled. Patient denies chest pain, leg edema, ANGULO, palpitations. dizziness. Tolerates Furosemide well. Her SBP can be as low as 100.

## 2022-06-28 NOTE — PHYSICAL EXAM
[Well Developed] : well developed [No Acute Distress] : no acute distress [Obese] : obese [Normal Conjunctiva] : normal conjunctiva [Normal Venous Pressure] : normal venous pressure [No Carotid Bruit] : no carotid bruit [Normal S1, S2] : normal S1, S2 [No Murmur] : no murmur [No Rub] : no rub [S4] : S4 [Clear Lung Fields] : clear lung fields [Good Air Entry] : good air entry [No Respiratory Distress] : no respiratory distress  [Soft] : abdomen soft [Non Tender] : non-tender [Normal Bowel Sounds] : normal bowel sounds [Normal Gait] : normal gait [No Cyanosis] : no cyanosis [No Clubbing] : no clubbing [No Varicosities] : no varicosities [No Rash] : no rash [Moves all extremities] : moves all extremities [Normal Speech] : normal speech [Alert and Oriented] : alert and oriented [Normal memory] : normal memory [No Edema] : no edema

## 2022-06-28 NOTE — REVIEW OF SYSTEMS
[Weight Loss (___ Lbs)] : [unfilled] ~Ulb weight loss [Negative] : Heme/Lymph [Dyspnea on exertion] : not dyspnea during exertion [Chest Discomfort] : no chest discomfort [Lower Ext Edema] : no extremity edema [Leg Claudication] : no intermittent leg claudication [Palpitations] : no palpitations [Orthopnea] : no orthopnea [Rash] : no rash [Anxiety] : no anxiety [Easy Bleeding] : no tendency for easy bleeding [Easy Bruising] : no tendency for easy bruising

## 2022-06-28 NOTE — CARDIOLOGY SUMMARY
[de-identified] : 06/28/22:\par SB 87/min, 1st degree AVB, RVCD, LAFB with poor R wave progression.\par  [de-identified] : 06/21/2022 - Normal LVEF 62%, normal diastolic function, mild thickening of aortic valve leaflets.

## 2022-06-28 NOTE — ASSESSMENT
[FreeTextEntry1] : 71-yo female.\par HTN controlled now.\par Persistent A-fib, SR maintained on Flecainide.\par Patient is NSR, not bradycardic anymore.\par CHADS Vasc score 3..\par No edema on Furosemide.\par \par Plan:\par Continue Metoprolol, Flecainide, Xarelto \par Continue Losartan for HTN.\par Decrease Furosemide to 20 mg daily with K-dur 20 MeQ\par Blood work next month with PCP\par Diet, weight loss discussed.\par F/u in 3 months.\par \par Ferdinand Suarez MD\par

## 2022-09-06 ENCOUNTER — NON-APPOINTMENT (OUTPATIENT)
Age: 72
End: 2022-09-06

## 2022-09-07 ENCOUNTER — APPOINTMENT (OUTPATIENT)
Dept: CARDIOLOGY | Facility: CLINIC | Age: 72
End: 2022-09-07

## 2022-09-07 VITALS
WEIGHT: 234 LBS | HEIGHT: 68 IN | DIASTOLIC BLOOD PRESSURE: 74 MMHG | BODY MASS INDEX: 35.46 KG/M2 | SYSTOLIC BLOOD PRESSURE: 120 MMHG

## 2022-09-07 LAB
ANION GAP SERPL CALC-SCNC: 13 MMOL/L
BASOPHILS # BLD AUTO: 0.05 K/UL
BASOPHILS NFR BLD AUTO: 0.7 %
BUN SERPL-MCNC: 22 MG/DL
CALCIUM SERPL-MCNC: 9.4 MG/DL
CHLORIDE SERPL-SCNC: 98 MMOL/L
CO2 SERPL-SCNC: 27 MMOL/L
CREAT SERPL-MCNC: 1 MG/DL
EGFR: 59 ML/MIN/1.73M2
EOSINOPHIL # BLD AUTO: 0.13 K/UL
EOSINOPHIL NFR BLD AUTO: 1.9 %
GLUCOSE SERPL-MCNC: 112 MG/DL
HCT VFR BLD CALC: 47.2 %
HGB BLD-MCNC: 15.3 G/DL
IMM GRANULOCYTES NFR BLD AUTO: 0.3 %
INR PPP: 1.05 RATIO
LYMPHOCYTES # BLD AUTO: 1.68 K/UL
LYMPHOCYTES NFR BLD AUTO: 24.9 %
MAN DIFF?: NORMAL
MCHC RBC-ENTMCNC: 31.4 PG
MCHC RBC-ENTMCNC: 32.4 G/DL
MCV RBC AUTO: 96.9 FL
MONOCYTES # BLD AUTO: 0.71 K/UL
MONOCYTES NFR BLD AUTO: 10.5 %
NEUTROPHILS # BLD AUTO: 4.17 K/UL
NEUTROPHILS NFR BLD AUTO: 61.7 %
PLATELET # BLD AUTO: 190 K/UL
POTASSIUM SERPL-SCNC: 4.3 MMOL/L
PT BLD: 12.1 SEC
RBC # BLD: 4.87 M/UL
RBC # FLD: 12.8 %
SODIUM SERPL-SCNC: 138 MMOL/L
TSH SERPL-ACNC: 2.33 UIU/ML
WBC # FLD AUTO: 6.76 K/UL

## 2022-09-07 PROCEDURE — 93000 ELECTROCARDIOGRAM COMPLETE: CPT

## 2022-09-07 PROCEDURE — 99214 OFFICE O/P EST MOD 30 MIN: CPT

## 2022-09-07 RX ORDER — POTASSIUM CHLORIDE 1500 MG/1
20 TABLET, FILM COATED, EXTENDED RELEASE ORAL
Qty: 30 | Refills: 1 | Status: DISCONTINUED | COMMUNITY
Start: 2022-05-10 | End: 2022-09-07

## 2022-09-07 RX ORDER — METOPROLOL TARTRATE 25 MG/1
25 TABLET, FILM COATED ORAL TWICE DAILY
Qty: 180 | Refills: 1 | Status: DISCONTINUED | COMMUNITY
Start: 2021-11-22 | End: 2022-09-07

## 2022-09-07 NOTE — HISTORY OF PRESENT ILLNESS
[FreeTextEntry1] : 72-yo female with h/o HTN, persistent A-fib. S/p ZAHRA/CV at [facility] in December 2019. \par \par Patient reports irregular heart beat for about 1 week. BP has been controlled. Patient denies chest pain, leg edema, AGNULO. dizziness.

## 2022-09-07 NOTE — REVIEW OF SYSTEMS
[Weight Loss (___ Lbs)] : [unfilled] ~Ulb weight loss [Negative] : Heme/Lymph [Dyspnea on exertion] : not dyspnea during exertion [Chest Discomfort] : no chest discomfort [Lower Ext Edema] : no extremity edema [Leg Claudication] : no intermittent leg claudication [Orthopnea] : no orthopnea [Rash] : no rash [Anxiety] : no anxiety [Easy Bleeding] : no tendency for easy bleeding [Easy Bruising] : no tendency for easy bruising

## 2022-09-07 NOTE — ASSESSMENT
[FreeTextEntry1] : 72-yo female.\par HTN controlled now.\par Persistent A-fib, 1st recurrence since starting Flecainide in 2019.\par CHADS Vasc score 3..\par \par \par Plan:\par Continue Xarelto.\par Increase Flecainide to 100 mg bid.\par Hold Losartan, restart Metoprolol 50 mg bid. \par ZAHRA/CV next week.\par EP evaluation for A-fib ablation.\par F/u after cardioversion.\par \par Ferdinand Suarez MD\par

## 2022-09-07 NOTE — PHYSICAL EXAM
[Well Developed] : well developed [No Acute Distress] : no acute distress [Obese] : obese [Normal Conjunctiva] : normal conjunctiva [Normal Venous Pressure] : normal venous pressure [No Carotid Bruit] : no carotid bruit [No Murmur] : no murmur [No Rub] : no rub [S4] : S4 [Clear Lung Fields] : clear lung fields [Good Air Entry] : good air entry [No Respiratory Distress] : no respiratory distress  [Soft] : abdomen soft [Non Tender] : non-tender [Normal Bowel Sounds] : normal bowel sounds [Normal Gait] : normal gait [No Edema] : no edema [No Cyanosis] : no cyanosis [No Clubbing] : no clubbing [No Varicosities] : no varicosities [No Rash] : no rash [Moves all extremities] : moves all extremities [Normal Speech] : normal speech [Alert and Oriented] : alert and oriented [Normal memory] : normal memory [de-identified] : irregular S1, S2

## 2022-09-11 ENCOUNTER — LABORATORY RESULT (OUTPATIENT)
Age: 72
End: 2022-09-11

## 2022-09-13 ENCOUNTER — OUTPATIENT (OUTPATIENT)
Dept: OUTPATIENT SERVICES | Facility: HOSPITAL | Age: 72
LOS: 1 days | Discharge: HOME | End: 2022-09-13

## 2022-09-13 VITALS — WEIGHT: 231.93 LBS | HEIGHT: 69 IN

## 2022-09-13 DIAGNOSIS — Z90.89 ACQUIRED ABSENCE OF OTHER ORGANS: Chronic | ICD-10-CM

## 2022-09-13 DIAGNOSIS — I11.9 HYPERTENSIVE HEART DISEASE WITHOUT HEART FAILURE: ICD-10-CM

## 2022-09-13 DIAGNOSIS — Z98.890 OTHER SPECIFIED POSTPROCEDURAL STATES: Chronic | ICD-10-CM

## 2022-09-13 PROCEDURE — 93320 DOPPLER ECHO COMPLETE: CPT | Mod: 26

## 2022-09-13 PROCEDURE — 93312 ECHO TRANSESOPHAGEAL: CPT | Mod: 26

## 2022-09-13 PROCEDURE — 92960 CARDIOVERSION ELECTRIC EXT: CPT

## 2022-09-13 PROCEDURE — 93325 DOPPLER ECHO COLOR FLOW MAPG: CPT | Mod: 26

## 2022-09-13 RX ORDER — LOSARTAN POTASSIUM 100 MG/1
1 TABLET, FILM COATED ORAL
Qty: 0 | Refills: 0 | DISCHARGE

## 2022-09-13 NOTE — H&P CARDIOLOGY - NSICDXPASTMEDICALHX_GEN_ALL_CORE_FT
PAST MEDICAL HISTORY:  Adult hypothyroidism     Atrial fibrillation, unspecified type cardioversion x 2    H/O Raynaud's syndrome     HTN (hypertension)     Mixed connective tissue disease ??

## 2022-09-13 NOTE — CHART NOTE - NSCHARTNOTEFT_GEN_A_CORE
POST OPERATIVE PROCEDURAL DOCUMENTATION    PRE-OP DIAGNOSIS: Atrial fibrillation    POST-OP DIAGNOSIS: Mild-Moderate MR with degenerative anterior leaflet thickening. Successful cardioversion to normal sinus rhythm.    PROCEDURE: Transesophageal echocardiogram    Primary Physician: Dr. Suarez  Fellow: Dr. Saenz    ANESTHESIA TYPE  [  ] General Anesthesia  [ x ] Conscious Sedation  [  ] Local/Regional    CONDITION  [  ] Critical  [  ] Serious  [  ] Fair  [ x ] Good    SPECIMENS REMOVED (IF APPLICABLE): N/A    IMPLANTS (IF APPLICABLE): None    ESTIMATED BLOOD LOSS: None    COMPLICATIONS: None      FINDINGS:    After risks and benefits of procedures were explained, informed consent was obtained and placed in chart. Refer to Anesthesia note for sedation details.  The ZAHRA probe was passed into the esophagus without difficulty.  Transesophageal and transgastric images were obtained.  The ZAHRA probe was removed without difficulty and examined.  There was no evidence for bleeding.  The patient tolerated the procedure well without any immediate ZAHRA-related complications.      Preliminary Findings:  LA: Dilated  CINDY: Left atrial appendage was clear of clot and smoke.  LV: LVEF was estimated at 50%  MV: Mild-Moderate MR with degenerative anterior leaflet thickening, no evidence of MS.   AV: Mild AI, no evidence of AS.   RA: Dilated  TV: Mild TR.   PV: Mild PI.   IAS: no PFO. No R-> L shunt.   There was mild, non-mobile atheroma seen in the thoracic aorta.     Patient successfully converted to sinus rhythm with synchronized  200J of direct current cardioversion.    DIAGNOSIS/IMPRESSION: Mild-Moderate MR with degenerative anterior leaflet thickening. Successful cardioversion to normal sinus rhythm.    PLAN OF CARE:    D/C home when medically stable  C/w anticoagulation  Outpt follow up with Dr. Suarez POST OPERATIVE PROCEDURAL DOCUMENTATION    PRE-OP DIAGNOSIS: Atrial fibrillation    POST-OP DIAGNOSIS: Mild-Moderate MR with degenerative anterior leaflet thickening. Successful cardioversion to normal sinus rhythm.    PROCEDURE: Transesophageal echocardiogram    Primary Physician: Dr. Suarez  Fellow: Dr. Saenz    ANESTHESIA TYPE  [  ] General Anesthesia  [ x ] Conscious Sedation  [  ] Local/Regional    CONDITION  [  ] Critical  [  ] Serious  [  ] Fair  [ x ] Good    SPECIMENS REMOVED (IF APPLICABLE): N/A    IMPLANTS (IF APPLICABLE): None    ESTIMATED BLOOD LOSS: None    COMPLICATIONS: None      FINDINGS:    After risks and benefits of procedures were explained, informed consent was obtained and placed in chart. Refer to Anesthesia note for sedation details.  The ZAHRA probe was passed into the esophagus without difficulty.  Transesophageal and transgastric images were obtained.  The ZAHRA probe was removed without difficulty and examined.  There was no evidence for bleeding.  The patient tolerated the procedure well without any immediate ZAHRA-related complications.      Preliminary Findings:  LA: Dilated  CINDY: Left atrial appendage was clear of clot and smoke.  LV: LVEF was estimated at 55-60%  MV: Mild-Moderate MR with degenerative anterior leaflet thickening, no evidence of MS.   AV: Mild AI, no evidence of AS.   RA: Dilated  TV: Mild TR.   PV: Mild PI.   IAS: no PFO. No R-> L shunt.   There was mild, non-mobile atheroma seen in the thoracic aorta.     Patient successfully converted to sinus rhythm with synchronized  200J of direct current cardioversion.    DIAGNOSIS/IMPRESSION: Mild-Moderate MR with degenerative anterior leaflet thickening. Successful cardioversion to normal sinus rhythm.    PLAN OF CARE:    D/C home when medically stable  C/w anticoagulation  Outpt follow up with Dr. Suarez

## 2022-09-13 NOTE — H&P CARDIOLOGY - HISTORY OF PRESENT ILLNESS
HPI  71 y/o F w/ PMH of Afib here for ZAHRA/DCCV. Pt endorses occasional palpitations when she is in Afib. She had a ZAHRA/DCCV 3 years ago.    REVIEW OF SYSTEMS:  CONSTITUTIONAL: No weakness, fevers or chills  EYES/ENT: No visual changes;  No vertigo or throat pain   NECK: No pain or stiffness  RESPIRATORY: No cough, wheezing, hemoptysis; SEE HPI  CARDIOVASCULAR: SEE HPI  GASTROINTESTINAL: No abdominal or epigastric pain. No nausea, vomiting, or hematemesis; No diarrhea or constipation. No melena or hematochezia.  GENITOURINARY: No dysuria, frequency or hematuria  NEUROLOGICAL: No numbness or weakness  SKIN: No itching, rashes      PHYSICAL EXAM:    GENERAL: NAD  HEAD:  Atraumatic, Normocephalic  EYES: conjunctiva and sclera clear  NECK: No JVD  CHEST/LUNG: Clear to auscultation bilaterally; No wheeze  HEART: irregular  ABDOMEN: Soft, Nontender, Nondistended; Bowel sounds present  EXTREMITIES:  2+ Peripheral Pulses, No clubbing, cyanosis, or edema  NEUROLOGY:  A&Ox3, appropriate  SKIN: No rashes or lesions

## 2022-09-13 NOTE — H&P CARDIOLOGY - VENOUS THROMBOEMBOLISM
----- Message from Loretta Lopez MD sent at 7/31/2017  9:27 AM CDT -----  He can hold the plavix for 5 days prior to his surgery. I would prefer he continue his asa if possible. If it needs to be held, it should be stopped 7 days prior to his procedure.  ----- Message -----  From: Karen Dumont MA  Sent: 7/28/2017   2:50 PM  To: Loretta Lopez MD        ----- Message -----  From: Terri Reyes RN  Sent: 7/28/2017   2:33 PM  To: Terri Reyes RN, Jessica Burgos Staff    Patient is having  A Sphincterotomy-Lateral Internal Anal on 8/11/17,  with Dr. GILL Wells.  Request preop instructions regarding Plavix and baby Aspirin -when should patient hold these two meds prior to surgery date?  Await your reply. Thank you. Sincerely, Terri Reyes RN  7/28/17    
Will inform the department  
no

## 2022-09-27 ENCOUNTER — RESULT CHARGE (OUTPATIENT)
Age: 72
End: 2022-09-27

## 2022-09-27 ENCOUNTER — APPOINTMENT (OUTPATIENT)
Dept: CARDIOLOGY | Facility: CLINIC | Age: 72
End: 2022-09-27

## 2022-09-27 VITALS
DIASTOLIC BLOOD PRESSURE: 78 MMHG | WEIGHT: 233 LBS | HEART RATE: 54 BPM | HEIGHT: 68 IN | BODY MASS INDEX: 35.31 KG/M2 | SYSTOLIC BLOOD PRESSURE: 139 MMHG

## 2022-09-27 PROCEDURE — 99214 OFFICE O/P EST MOD 30 MIN: CPT

## 2022-09-27 PROCEDURE — 93000 ELECTROCARDIOGRAM COMPLETE: CPT

## 2022-09-27 RX ORDER — LOSARTAN POTASSIUM 100 MG/1
100 TABLET, FILM COATED ORAL DAILY
Qty: 90 | Refills: 1 | Status: DISCONTINUED | COMMUNITY
Start: 2022-02-06 | End: 2022-09-27

## 2022-09-27 NOTE — CARDIOLOGY SUMMARY
[de-identified] : 09/07/22:\par A-fib, /min, RVCD, LAFB with poor R wave progression.\par 09/27/2022: Sinus bradycardia 54bpm, 1st degree AV block. \par  [de-identified] : 06/21/2022 - Normal LVEF 62%, normal diastolic function, mild thickening of aortic valve leaflets.

## 2022-09-27 NOTE — HISTORY OF PRESENT ILLNESS
[FreeTextEntry1] : 72-yo female with h/o HTN, persistent A-fib. S/p ZAHRA/CV at [facility] in December 2019. \par \par Patient is s/p DCCV on 0913/2022, remains in NSR. She went back to Flecainide 50 mg PO BID due to slow HR (40 bpm). She feels well, denies chest pain, reports increased energy level. Her ZAHRA at the time of DCCV showed moderate MR.

## 2022-09-27 NOTE — PHYSICAL EXAM
[Well Developed] : well developed [No Acute Distress] : no acute distress [Obese] : obese [Normal Conjunctiva] : normal conjunctiva [Normal Venous Pressure] : normal venous pressure [No Carotid Bruit] : no carotid bruit [No Murmur] : no murmur [No Rub] : no rub [S4] : S4 [Clear Lung Fields] : clear lung fields [Good Air Entry] : good air entry [No Respiratory Distress] : no respiratory distress  [Soft] : abdomen soft [Non Tender] : non-tender [Normal Bowel Sounds] : normal bowel sounds [Normal Gait] : normal gait [No Edema] : no edema [No Cyanosis] : no cyanosis [No Clubbing] : no clubbing [No Varicosities] : no varicosities [No Rash] : no rash [Moves all extremities] : moves all extremities [Normal Speech] : normal speech [Alert and Oriented] : alert and oriented [Normal memory] : normal memory [Normal S1, S2] : normal S1, S2

## 2022-09-27 NOTE — ASSESSMENT
[FreeTextEntry1] : 72-yo female.\par HTN controlled now.\par Persistent A-fib, s/p cardioversion.\par Symptomatic bradycardia now.\par CHADS Vasc score 3..\par \par \par Plan:\par Continue Xarelto.\par Increase Flecainide to 100 mg bid.\par Restart Losartan 50 mg PO daily.\par Discontinue Metoprolol.\par EP evaluation for A-fib ablation.\par F/U in 1 month\par \par Ferdinnad Suarez MD\par

## 2022-09-27 NOTE — REVIEW OF SYSTEMS
[Weight Loss (___ Lbs)] : [unfilled] ~Ulb weight loss [Negative] : Heme/Lymph [Fever] : no fever [Chills] : no chills [Feeling Fatigued] : feeling fatigued [Dyspnea on exertion] : not dyspnea during exertion [Chest Discomfort] : no chest discomfort [Lower Ext Edema] : no extremity edema [Leg Claudication] : no intermittent leg claudication [Palpitations] : no palpitations [Orthopnea] : no orthopnea [Rash] : no rash [Anxiety] : no anxiety [Easy Bleeding] : no tendency for easy bleeding [Easy Bruising] : no tendency for easy bruising

## 2022-10-13 ENCOUNTER — APPOINTMENT (OUTPATIENT)
Dept: CARDIOLOGY | Facility: CLINIC | Age: 72
End: 2022-10-13

## 2022-10-13 VITALS
TEMPERATURE: 97.9 F | BODY MASS INDEX: 34.86 KG/M2 | HEIGHT: 68 IN | RESPIRATION RATE: 16 BRPM | DIASTOLIC BLOOD PRESSURE: 80 MMHG | SYSTOLIC BLOOD PRESSURE: 140 MMHG | HEART RATE: 83 BPM | WEIGHT: 230 LBS

## 2022-10-13 PROCEDURE — 93000 ELECTROCARDIOGRAM COMPLETE: CPT

## 2022-10-13 PROCEDURE — 99215 OFFICE O/P EST HI 40 MIN: CPT | Mod: 57

## 2022-10-13 RX ORDER — FLECAINIDE ACETATE 50 MG/1
50 TABLET ORAL TWICE DAILY
Qty: 180 | Refills: 1 | Status: COMPLETED | COMMUNITY
End: 2022-10-13

## 2022-10-13 RX ORDER — METOPROLOL TARTRATE 50 MG/1
50 TABLET, FILM COATED ORAL
Qty: 180 | Refills: 3 | Status: COMPLETED | COMMUNITY
End: 2022-10-13

## 2022-10-14 NOTE — HISTORY OF PRESENT ILLNESS
[FreeTextEntry1] : Patient is a very pleasant 72 year old woman with history of hypertension, hyperthyroidism, and persistent atrial fibrillation for the past 6-8 months. Patient recently underwent cardioversion back to sinus rhythm and had failed flecainide in the past. Patient comes to my office for further evaluation and discussion of ablation. \par \par EKG sinus rhythm with first degree AV block 83 BPM

## 2022-10-14 NOTE — ADDENDUM
[FreeTextEntry1] : IClayton assisted in documentation on 10/13/2022 acting as a scribe for Dr. Nilton Galindo.

## 2022-10-14 NOTE — ASSESSMENT
[FreeTextEntry1] : Persistent Atrial Fibrillation\par - CHADSVASC of 3\par - Continue Xarelto 20mg daily\par - I had a long discussion with the patient as to different options for treatment of her atrial fibrillation. Patient would like to proceed with an ablation. \par - Continue flecainide for now. Will discontinue after ablation.\par - I also discussed with patient the possibility of participating in the iCLAS trial. I have provided the patient with a consent form for her to review at home. Patient will be contacted by the research team in 1 week for enrollment. \par \par AF ablation\par I have discussed different treatment options with LUCERO CRUZ including anti-arrhythmic medications or ablation.  After a long discussion, the patient would like to proceed with an ablation.  I have explained the risks and benefits of the procedure to the patient.  There is approximately 1-2% chance of any major cardiovascular complication to occur. Complications include, but are not limited to infection, bleeding, damage to the vessels, hole in the heart, stroke, death and heart attack. There is also 1% risk of phrenic nerve injury.  The patient understands the risk and would like to proceed with the procedure. Patient had opportunity to ask questions. Patient indicated that all of her questions were answered to her satisfaction and verbalized understanding.\par  \par \par I, Nilton Galindo, personally performed the services described in this documentation. All medical record entries made by the scribe/nurse NATALIE were at my direction and in my presence. I have reviewed the chart and agree that the record reflects my personal performance and is accurate and complete.\par \par \par \par \par

## 2022-10-24 ENCOUNTER — OUTPATIENT (OUTPATIENT)
Dept: OUTPATIENT SERVICES | Facility: HOSPITAL | Age: 72
LOS: 1 days | Discharge: HOME | End: 2022-10-24

## 2022-10-24 DIAGNOSIS — Z98.890 OTHER SPECIFIED POSTPROCEDURAL STATES: Chronic | ICD-10-CM

## 2022-10-24 DIAGNOSIS — Z90.89 ACQUIRED ABSENCE OF OTHER ORGANS: Chronic | ICD-10-CM

## 2022-10-24 DIAGNOSIS — R11.0 NAUSEA: ICD-10-CM

## 2022-10-24 PROCEDURE — 76700 US EXAM ABDOM COMPLETE: CPT | Mod: 26

## 2022-10-25 ENCOUNTER — APPOINTMENT (OUTPATIENT)
Dept: CARDIOLOGY | Facility: CLINIC | Age: 72
End: 2022-10-25

## 2022-10-25 VITALS
HEIGHT: 68 IN | WEIGHT: 233 LBS | DIASTOLIC BLOOD PRESSURE: 70 MMHG | BODY MASS INDEX: 35.31 KG/M2 | SYSTOLIC BLOOD PRESSURE: 140 MMHG

## 2022-10-25 DIAGNOSIS — I34.0 NONRHEUMATIC MITRAL (VALVE) INSUFFICIENCY: ICD-10-CM

## 2022-10-25 PROCEDURE — 93000 ELECTROCARDIOGRAM COMPLETE: CPT

## 2022-10-25 PROCEDURE — 99214 OFFICE O/P EST MOD 30 MIN: CPT

## 2022-10-25 NOTE — HISTORY OF PRESENT ILLNESS
[FreeTextEntry1] : 72-yo female with h/o HTN, persistent A-fib. S/p ZAHRA/CV at [facility] in December 2019. \par \par Patient is s/p DCCV on 0913/2022, remains in NSR. She went back to Flecainide 50 mg PO BID due to slow HR (40 bpm). She feels well, denies chest pain, reports increased energy level. Her ZAHRA at the time of DCCV showed moderate MR.\par \par 10/25/2022: Patient was seen by EP, is going for Afib ablation in December 2022. She is tolerating Flecainide 100 mg BID well, no palpitations, no bradycardia. BP remains borderline elevated  on Losartan 100 mg and HCTZ 25 mg.. Reports mild ankle swelling at the end of the day.

## 2022-10-25 NOTE — ASSESSMENT
[FreeTextEntry1] : 72-yo female.\par HTN.\par Persistent A-fib, s/p cardioversion.\par CHADS Vasc score 3.\par Leg edema.\par \par \par Plan:\par Continue Xarelto.\par Continue Flecainide to 100 mg bid.\par Continue Losartan 100 mg/HCTZ 25 mg daily.\par Furosemide 20 mg as needed for edema.\par F/U after ablation in 3 months.\par \par Ferdinand Suarez MD\par

## 2022-10-25 NOTE — CARDIOLOGY SUMMARY
[de-identified] : 09/07/22:\par A-fib, /min, RVCD, LAFB with poor R wave progression.\par 09/27/2022: Sinus bradycardia 54bpm, 1st degree AV block. \par 10/25/2022: SR 77 bpm, AVB 1 degree. \par  [de-identified] : 06/21/2022 - Normal LVEF 62%, normal diastolic function, mild thickening of aortic valve leaflets.

## 2022-10-25 NOTE — REVIEW OF SYSTEMS
[Feeling Fatigued] : feeling fatigued [Negative] : Heme/Lymph [Fever] : no fever [Chills] : no chills [Dyspnea on exertion] : not dyspnea during exertion [Chest Discomfort] : no chest discomfort [Lower Ext Edema] : lower extremity edema [Leg Claudication] : no intermittent leg claudication [Palpitations] : no palpitations [Orthopnea] : no orthopnea [Rash] : no rash [Anxiety] : no anxiety [Easy Bleeding] : no tendency for easy bleeding [Easy Bruising] : no tendency for easy bruising

## 2022-10-25 NOTE — PHYSICAL EXAM
[Well Developed] : well developed [No Acute Distress] : no acute distress [Obese] : obese [Normal Conjunctiva] : normal conjunctiva [Normal Venous Pressure] : normal venous pressure [No Carotid Bruit] : no carotid bruit [Normal S1, S2] : normal S1, S2 [No Murmur] : no murmur [No Rub] : no rub [S4] : S4 [Clear Lung Fields] : clear lung fields [Good Air Entry] : good air entry [No Respiratory Distress] : no respiratory distress  [Soft] : abdomen soft [Non Tender] : non-tender [Normal Bowel Sounds] : normal bowel sounds [Normal Gait] : normal gait [No Cyanosis] : no cyanosis [No Clubbing] : no clubbing [No Varicosities] : no varicosities [No Rash] : no rash [Moves all extremities] : moves all extremities [Normal Speech] : normal speech [Alert and Oriented] : alert and oriented [Normal memory] : normal memory [Edema ___] : edema [unfilled]

## 2022-10-26 ENCOUNTER — RESULT CHARGE (OUTPATIENT)
Age: 72
End: 2022-10-26

## 2022-11-19 ENCOUNTER — TRANSCRIPTION ENCOUNTER (OUTPATIENT)
Age: 72
End: 2022-11-19

## 2022-11-22 NOTE — ED PROVIDER NOTE - NSFOLLOWUPINSTRUCTIONS_ED_ALL_ED_FT
GENERAL SURGERY PROGRESS NOTE:    HISTORY: Baldemar Smiht is a 32-year-old male with no significant past medical or abdominal surgical history.  He was admitted to [facility] from 6/28 through 6/30 for diverticulitis with a 2 cm abscess.  He was followed by Dr. Banerjee and managed conservatively with Cipro and Flagyl.  We were planning for laparoscopic sigmoidectomy 10-6.  On his preoperative colonoscopy he had findings of aphthous ulcers of the rectum and distal sigmoid colon (18 cm from the anal verge).  Surgery was canceled for concern for IBD.  Pathology revealed prominent lymphoid aggregates with superficial erosion, no IBD, negative CMV and HSV.  Fecal calprotectin obtained on 10/15 was elevated at 157.  He underwent repeat flexible sigmoidoscopy 11/16/2022.  There were very few superficial residual aphthous ulcers.  Biopsies revealed benign mucosa without abnormality.  He is here today to discuss whether or not we should proceed with sigmoidectomy.    He has been feeling well.  No abdominal pain.  His bowel movements are mostly soft and formed.  He has been focusing on eating high-fiber in may have some more diarrhea when he does so.  No melena or hematochezia.    He does not have a significant history of abdominal pain, diarrhea, melena, hematochezia, oral ulcers, or perianal disease.    LABS:  As above    IMAGING:  I reviewed the images from his flexible sigmoidoscopy 11-16    PHYSICAL EXAM:  Visit Vitals  /74 (BP Location: LUE - Left upper extremity, Patient Position: Sitting, Cuff Size: Regular)   Pulse 78   Temp 98.5 °F (36.9 °C) (Temporal)   Resp 16   Ht 5' 10\" (1.778 m)   Wt 80.7 kg (177 lb 14.4 oz)   SpO2 97%   BMI 25.53 kg/m²   Constitutional: No acute distress, alert and oriented conversationally, sitting comfortably in chair, pleasant, well appearing  Heart: RR  Lungs: no respiratory distress  Abd: soft, NT, ND    ASSESSMENT/PLAN: Baldemar Smith is a 33 year old male with 1 episode of  perforated diverticulitis with a small undrainable abscess that was successfully managed with antibiotics.  Baldemar is very interested in minimally invasive sigmoidectomy to prevent future episodes of diverticulitis.  However, he does not fit the picture of a typical patient with diverticulitis and he had an incidental finding of scattered aphthous ulcers of the rectum and distal sigmoid on colonoscopy.  Fecal calprotectin at that time was also elevated.  Pathology was negative for IBD.    I am still concerned about a possible diagnosis of IBD.  Sometimes diagnosis and differentiation can be elusive.  I worry that if we proceed with laparoscopic sigmoidectomy in the setting of IBD, he would be more prone to postoperative complications like anastomotic leak and IBD exacerbation.    My impression is that we should wait to see if he has recurrent episodes of diverticulitis in the future, the chance of which is 25%.  I will present his case at General surgery conference tomorrow for more opinions.  I will call him with our discussion and recommendations.    - On the day of the encounter, I spent > 20 minutes in total preparing for the visit by reviewing notes and interpreting prior labs and imaging, obtaining the history, performing the physical exam, educating the patient, and completing documentation in the EMR.    Kamille Martin MD    Addendum:  I presented the patient's case at General surgery conference this morning.  We reviewed the initial CT scan, colonoscopy images, and pathology.  Colorectal surgery had no concern for IBD and recommended proceeding with laparoscopic sigmoidectomy for diverticulitis.  We will try to get this scheduled before the end of the year.     Knee Pain, Adult    Knee pain in adults is common. It can be caused by many things, including:    Arthritis.  A fluid-filled sac (cyst) or growth in your knee.  An infection in your knee.  An injury that will not heal.  Damage, swelling, or irritation of the tissues that support your knee.    Knee pain is usually not a sign of a serious problem. The pain may go away on its own with time and rest. If it does not, a health care provider may order tests to find the cause of the pain. These may include:    Imaging tests, such as an X-ray, MRI, or ultrasound.  Joint aspiration. In this test, fluid is removed from the knee.  Arthroscopy. In this test, a lighted tube is inserted into knee and an image is projected onto a TV screen.  A biopsy. In this test, a sample of tissue is removed from the body and studied under a microscope.    Follow these instructions at home:  Pay attention to any changes in your symptoms. Take these actions to relieve your pain.    Activity     Rest your knee.  Do not do things that cause pain or make pain worse.  Avoid high-impact activities or exercises, such as running, jumping rope, or doing jumping jacks.  General instructions     Take over-the-counter and prescription medicines only as told by your health care provider.  Raise (elevate) your knee above the level of your heart when you are sitting or lying down.  Sleep with a pillow under your knee.  If directed, apply ice to the knee:    Put ice in a plastic bag.  Place a towel between your skin and the bag.  Leave the ice on for 20 minutes, 2–3 times a day.    Ask your health care provider if you should wear an elastic knee support.  Lose weight if you are overweight. Extra weight can put pressure on your knee.  Do not use any products that contain nicotine or tobacco, such as cigarettes and e-cigarettes. Smoking may slow the healing of any bone and joint problems that you may have. If you need help quitting, ask your health care provider.  Contact a health care provider if:  Your knee pain continues, changes, or gets worse.  You have a fever along with knee pain.  Your knee juan r or locks up.  Your knee swells, and the swelling becomes worse.  Get help right away if:  Your knee feels warm to the touch.  You cannot move your knee.  You have severe pain in your knee.  You have chest pain.  You have trouble breathing.  Summary  Knee pain in adults is common. It can be caused by many things, including, arthritis, infection, cysts, or injury.  Knee pain is usually not a sign of a serious problem, but if it does not go away, a health care provider may perform tests to know the cause of the pain.  Pay attention to any changes in your symptoms. Relieve your pain with rest, medicines, light activity, and use of ice.  Get help if your pain continues or becomes very severe, or if your knee juan r or locks up, or if you have chest pain or trouble breathing.  This information is not intended to replace advice given to you by your health care provider. Make sure you discuss any questions you have with your health care provider.

## 2022-11-28 ENCOUNTER — OUTPATIENT (OUTPATIENT)
Dept: OUTPATIENT SERVICES | Facility: HOSPITAL | Age: 72
LOS: 1 days | Discharge: HOME | End: 2022-11-28

## 2022-11-28 ENCOUNTER — RESULT REVIEW (OUTPATIENT)
Age: 72
End: 2022-11-28

## 2022-11-28 VITALS
WEIGHT: 233.69 LBS | TEMPERATURE: 97 F | HEART RATE: 90 BPM | SYSTOLIC BLOOD PRESSURE: 136 MMHG | OXYGEN SATURATION: 97 % | HEIGHT: 68 IN | DIASTOLIC BLOOD PRESSURE: 60 MMHG | RESPIRATION RATE: 16 BRPM

## 2022-11-28 DIAGNOSIS — I48.0 PAROXYSMAL ATRIAL FIBRILLATION: ICD-10-CM

## 2022-11-28 DIAGNOSIS — Z01.818 ENCOUNTER FOR OTHER PREPROCEDURAL EXAMINATION: ICD-10-CM

## 2022-11-28 DIAGNOSIS — Z90.89 ACQUIRED ABSENCE OF OTHER ORGANS: Chronic | ICD-10-CM

## 2022-11-28 DIAGNOSIS — Z90.710 ACQUIRED ABSENCE OF BOTH CERVIX AND UTERUS: Chronic | ICD-10-CM

## 2022-11-28 DIAGNOSIS — Z98.890 OTHER SPECIFIED POSTPROCEDURAL STATES: Chronic | ICD-10-CM

## 2022-11-28 LAB
ALBUMIN SERPL ELPH-MCNC: 4.3 G/DL — SIGNIFICANT CHANGE UP (ref 3.5–5.2)
ALP SERPL-CCNC: 104 U/L — SIGNIFICANT CHANGE UP (ref 30–115)
ALT FLD-CCNC: 17 U/L — SIGNIFICANT CHANGE UP (ref 0–41)
ANION GAP SERPL CALC-SCNC: 13 MMOL/L — SIGNIFICANT CHANGE UP (ref 7–14)
APTT BLD: 32.7 SEC — SIGNIFICANT CHANGE UP (ref 27–39.2)
AST SERPL-CCNC: 19 U/L — SIGNIFICANT CHANGE UP (ref 0–41)
BASOPHILS # BLD AUTO: 0.03 K/UL — SIGNIFICANT CHANGE UP (ref 0–0.2)
BASOPHILS NFR BLD AUTO: 0.5 % — SIGNIFICANT CHANGE UP (ref 0–1)
BILIRUB SERPL-MCNC: 0.6 MG/DL — SIGNIFICANT CHANGE UP (ref 0.2–1.2)
BUN SERPL-MCNC: 21 MG/DL — HIGH (ref 10–20)
CALCIUM SERPL-MCNC: 9.5 MG/DL — SIGNIFICANT CHANGE UP (ref 8.4–10.5)
CHLORIDE SERPL-SCNC: 100 MMOL/L — SIGNIFICANT CHANGE UP (ref 98–110)
CO2 SERPL-SCNC: 29 MMOL/L — SIGNIFICANT CHANGE UP (ref 17–32)
CREAT SERPL-MCNC: 0.9 MG/DL — SIGNIFICANT CHANGE UP (ref 0.7–1.5)
EGFR: 68 ML/MIN/1.73M2 — SIGNIFICANT CHANGE UP
EOSINOPHIL # BLD AUTO: 0.05 K/UL — SIGNIFICANT CHANGE UP (ref 0–0.7)
EOSINOPHIL NFR BLD AUTO: 0.8 % — SIGNIFICANT CHANGE UP (ref 0–8)
GLUCOSE SERPL-MCNC: 127 MG/DL — HIGH (ref 70–99)
HCT VFR BLD CALC: 44 % — SIGNIFICANT CHANGE UP (ref 37–47)
HGB BLD-MCNC: 14.7 G/DL — SIGNIFICANT CHANGE UP (ref 12–16)
IMM GRANULOCYTES NFR BLD AUTO: 0.2 % — SIGNIFICANT CHANGE UP (ref 0.1–0.3)
INR BLD: 1.11 RATIO — SIGNIFICANT CHANGE UP (ref 0.65–1.3)
LYMPHOCYTES # BLD AUTO: 1.45 K/UL — SIGNIFICANT CHANGE UP (ref 1.2–3.4)
LYMPHOCYTES # BLD AUTO: 24.5 % — SIGNIFICANT CHANGE UP (ref 20.5–51.1)
MCHC RBC-ENTMCNC: 32 PG — HIGH (ref 27–31)
MCHC RBC-ENTMCNC: 33.4 G/DL — SIGNIFICANT CHANGE UP (ref 32–37)
MCV RBC AUTO: 95.9 FL — SIGNIFICANT CHANGE UP (ref 81–99)
MONOCYTES # BLD AUTO: 0.56 K/UL — SIGNIFICANT CHANGE UP (ref 0.1–0.6)
MONOCYTES NFR BLD AUTO: 9.4 % — HIGH (ref 1.7–9.3)
NEUTROPHILS # BLD AUTO: 3.83 K/UL — SIGNIFICANT CHANGE UP (ref 1.4–6.5)
NEUTROPHILS NFR BLD AUTO: 64.6 % — SIGNIFICANT CHANGE UP (ref 42.2–75.2)
NRBC # BLD: 0 /100 WBCS — SIGNIFICANT CHANGE UP (ref 0–0)
PLATELET # BLD AUTO: 206 K/UL — SIGNIFICANT CHANGE UP (ref 130–400)
POTASSIUM SERPL-MCNC: 3.8 MMOL/L — SIGNIFICANT CHANGE UP (ref 3.5–5)
POTASSIUM SERPL-SCNC: 3.8 MMOL/L — SIGNIFICANT CHANGE UP (ref 3.5–5)
PROT SERPL-MCNC: 6.8 G/DL — SIGNIFICANT CHANGE UP (ref 6–8)
PROTHROM AB SERPL-ACNC: 12.7 SEC — SIGNIFICANT CHANGE UP (ref 9.95–12.87)
RBC # BLD: 4.59 M/UL — SIGNIFICANT CHANGE UP (ref 4.2–5.4)
RBC # FLD: 12.3 % — SIGNIFICANT CHANGE UP (ref 11.5–14.5)
SODIUM SERPL-SCNC: 142 MMOL/L — SIGNIFICANT CHANGE UP (ref 135–146)
WBC # BLD: 5.93 K/UL — SIGNIFICANT CHANGE UP (ref 4.8–10.8)
WBC # FLD AUTO: 5.93 K/UL — SIGNIFICANT CHANGE UP (ref 4.8–10.8)

## 2022-11-28 PROCEDURE — 93010 ELECTROCARDIOGRAM REPORT: CPT

## 2022-11-28 PROCEDURE — 71046 X-RAY EXAM CHEST 2 VIEWS: CPT | Mod: 26

## 2022-11-28 RX ORDER — RIVAROXABAN 15 MG-20MG
1 KIT ORAL
Qty: 0 | Refills: 0 | DISCHARGE

## 2022-11-28 RX ORDER — FLECAINIDE ACETATE 50 MG
1 TABLET ORAL
Qty: 0 | Refills: 0 | DISCHARGE

## 2022-11-28 RX ORDER — HYDROCHLOROTHIAZIDE 25 MG
1 TABLET ORAL
Qty: 0 | Refills: 0 | DISCHARGE

## 2022-11-28 RX ORDER — METOPROLOL TARTRATE 50 MG
1 TABLET ORAL
Qty: 0 | Refills: 0 | DISCHARGE

## 2022-11-28 RX ORDER — LEVOTHYROXINE SODIUM 125 MCG
1 TABLET ORAL
Qty: 0 | Refills: 0 | DISCHARGE

## 2022-11-28 RX ORDER — LOSARTAN POTASSIUM 100 MG/1
1 TABLET, FILM COATED ORAL
Qty: 0 | Refills: 0 | DISCHARGE

## 2022-11-28 NOTE — H&P PST ADULT - HISTORY OF PRESENT ILLNESS
Patient is a 72 year old female presenting to [facility] in preparation for AFIB ABLATION ZAHRA  on 12/12 by Dr. Galindo.  per pt has had AFIB for past 8-9 yrs. has had 3 cardioversions. presently on meds & now pt states he wants to do an ablation  pt presently  asymptomatic    PATIENT CURRENTLY DENIES CHEST PAIN  SHORTNESS OF BREATH  PALPITATIONS,  DYSURIA, OR UPPER RESPIRATORY INFECTION IN PAST 2 WEEKS  EXERCISE  TOLERANCE  2  FLIGHT OF STAIRS  WITHOUT SHORTNESS OF BREATH  denies travel outside the [place] in the past 30 days  Patient denies any signs or symptoms of COVID 19 and denies contact with known positive individuals.  They have an appointment for repeat COVID testing pre-procedure and acknowledge its time and place.  They were instructed to quarantine pre-procedure, practice exposure control measures, continue to self-monitor and report any concerns to their proceduralist.  pt advised self quarantine till day of procedure  Anesthesia Alert  NO--Difficult Airway  NO--History of neck surgery or radiation  NO--Limited ROM of neck  NO--History of Malignant hyperthermia  NO--No personal or family history of Pseudocholinesterase deficiency.  NO--Prior Anesthesia Complication  NO--Latex Allergy  NO--Loose teeth  NO--History of Rheumatoid Arthritis  NO--Bleeding risk  NO--ARMIN  NO--Other_____    PT DENIES ANY RASHES, ABRASION, OR OPEN WOUNDS OR CUTS    AS PER THE PT, THIS IS HIS/HER COMPLETE MEDICAL AND SURGICAL HX, INCLUDING MEDICATIONS PRESCRIBED AND OVER THE COUNTER    Patient verbalized understanding of instructions and was given the opportunity to ask questions and have them answered.    pt denies any suicidal ideation or thoughts, pt states not a threat to self or others

## 2022-11-28 NOTE — H&P PST ADULT - NSICDXPASTSURGICALHX_GEN_ALL_CORE_FT
PAST SURGICAL HISTORY:  History of dilatation and curettage     History of hysterectomy     Status post tonsillectomy

## 2022-12-08 ENCOUNTER — TRANSCRIPTION ENCOUNTER (OUTPATIENT)
Age: 72
End: 2022-12-08

## 2022-12-12 ENCOUNTER — TRANSCRIPTION ENCOUNTER (OUTPATIENT)
Age: 72
End: 2022-12-12

## 2022-12-12 ENCOUNTER — INPATIENT (INPATIENT)
Facility: HOSPITAL | Age: 72
LOS: 0 days | Discharge: HOME | End: 2022-12-13
Attending: STUDENT IN AN ORGANIZED HEALTH CARE EDUCATION/TRAINING PROGRAM | Admitting: STUDENT IN AN ORGANIZED HEALTH CARE EDUCATION/TRAINING PROGRAM

## 2022-12-12 VITALS — HEIGHT: 68 IN | WEIGHT: 229.94 LBS

## 2022-12-12 DIAGNOSIS — Z90.710 ACQUIRED ABSENCE OF BOTH CERVIX AND UTERUS: Chronic | ICD-10-CM

## 2022-12-12 DIAGNOSIS — Z98.890 OTHER SPECIFIED POSTPROCEDURAL STATES: Chronic | ICD-10-CM

## 2022-12-12 DIAGNOSIS — Z90.89 ACQUIRED ABSENCE OF OTHER ORGANS: Chronic | ICD-10-CM

## 2022-12-12 PROCEDURE — 93325 DOPPLER ECHO COLOR FLOW MAPG: CPT | Mod: 26

## 2022-12-12 PROCEDURE — 93320 DOPPLER ECHO COMPLETE: CPT | Mod: 26

## 2022-12-12 PROCEDURE — 93623 PRGRMD STIMJ&PACG IV RX NFS: CPT | Mod: 26

## 2022-12-12 PROCEDURE — 93312 ECHO TRANSESOPHAGEAL: CPT | Mod: 26

## 2022-12-12 PROCEDURE — 93656 COMPRE EP EVAL ABLTJ ATR FIB: CPT

## 2022-12-12 RX ORDER — LEVOTHYROXINE SODIUM 125 MCG
50 TABLET ORAL DAILY
Refills: 0 | Status: DISCONTINUED | OUTPATIENT
Start: 2022-12-12 | End: 2022-12-13

## 2022-12-12 RX ORDER — PANTOPRAZOLE SODIUM 20 MG/1
40 TABLET, DELAYED RELEASE ORAL ONCE
Refills: 0 | Status: COMPLETED | OUTPATIENT
Start: 2022-12-12 | End: 2022-12-12

## 2022-12-12 RX ORDER — LOSARTAN POTASSIUM 100 MG/1
100 TABLET, FILM COATED ORAL DAILY
Refills: 0 | Status: DISCONTINUED | OUTPATIENT
Start: 2022-12-12 | End: 2022-12-13

## 2022-12-12 RX ORDER — FAMOTIDINE 10 MG/ML
20 INJECTION INTRAVENOUS ONCE
Refills: 0 | Status: COMPLETED | OUTPATIENT
Start: 2022-12-12 | End: 2022-12-12

## 2022-12-12 RX ORDER — PANTOPRAZOLE SODIUM 20 MG/1
40 TABLET, DELAYED RELEASE ORAL
Refills: 0 | Status: DISCONTINUED | OUTPATIENT
Start: 2022-12-13 | End: 2022-12-13

## 2022-12-12 RX ORDER — RIVAROXABAN 15 MG-20MG
20 KIT ORAL
Refills: 0 | Status: DISCONTINUED | OUTPATIENT
Start: 2022-12-12 | End: 2022-12-13

## 2022-12-12 RX ORDER — FLECAINIDE ACETATE 50 MG
100 TABLET ORAL EVERY 12 HOURS
Refills: 0 | Status: DISCONTINUED | OUTPATIENT
Start: 2022-12-12 | End: 2022-12-13

## 2022-12-12 RX ADMIN — Medication 100 MILLIGRAM(S): at 21:58

## 2022-12-12 RX ADMIN — FAMOTIDINE 20 MILLIGRAM(S): 10 INJECTION INTRAVENOUS at 21:58

## 2022-12-12 RX ADMIN — PANTOPRAZOLE SODIUM 40 MILLIGRAM(S): 20 TABLET, DELAYED RELEASE ORAL at 19:00

## 2022-12-12 RX ADMIN — RIVAROXABAN 20 MILLIGRAM(S): KIT at 19:00

## 2022-12-12 NOTE — DISCHARGE NOTE PROVIDER - PROVIDER TOKENS
PROVIDER:[TOKEN:[[id number]]],PROVIDER:[TOKEN:[[id number]],SCHEDULEDAPPT:[04/18/2023],SCHEDULEDAPPTTIME:[02:00 PM]]

## 2022-12-12 NOTE — DISCHARGE NOTE PROVIDER - NSDCMRMEDTOKEN_GEN_ALL_CORE_FT
flecainide 100 mg oral tablet: 1 tab(s) orally every 12 hours  hydroCHLOROthiazide 25 mg oral tablet: 1 tab(s) orally once a day  levothyroxine 50 mcg (0.05 mg) oral capsule: 1 cap(s) orally once a day  losartan 100 mg oral tablet: 1 tab(s) orally once a day  Xarelto 20 mg oral tablet: 1 tab(s) orally once a day (in the evening)   flecainide 100 mg oral tablet: 1 tab(s) orally every 12 hours  hydroCHLOROthiazide 25 mg oral tablet: 1 tab(s) orally once a day  levothyroxine 50 mcg (0.05 mg) oral capsule: 1 cap(s) orally once a day  losartan 100 mg oral tablet: 1 tab(s) orally once a day  pantoprazole 40 mg oral delayed release tablet: 1 tab(s) orally once a day (before a meal)  Xarelto 20 mg oral tablet: 1 tab(s) orally once a day (in the evening)   hydroCHLOROthiazide 25 mg oral tablet: 1 tab(s) orally once a day  levothyroxine 50 mcg (0.05 mg) oral capsule: 1 cap(s) orally once a day  losartan 100 mg oral tablet: 1 tab(s) orally once a day  pantoprazole 40 mg oral delayed release tablet: 1 tab(s) orally once a day (before a meal)  Xarelto 20 mg oral tablet: 1 tab(s) orally once a day (in the evening)

## 2022-12-12 NOTE — PROGRESS NOTE ADULT - SUBJECTIVE AND OBJECTIVE BOX
Electrophysiology Brief Post-Op Note      I have personally seen and examined the patient.  I agree with the history and physical which I have reviewed and noted any changes below.  12-12-22 @ 08:20    PRE-OP DIAGNOSIS: AF    POST-OP DIAGNOSIS: AF    PROCEDURE: ablation    Vendor Representative was present for clinical support.    Physician: Josie  Assistant: None    ANESTHESIA TYPE:  [ X ]General Anesthesia  [  ] Sedation  [ X ] Local/Regional    ESTIMATED BLOOD LOSS:    40   mL    CONDITION  [  ] Critical  [  ] Serious  [  ]Fair  [X  ]Good      SPECIMENS REMOVED (IF APPLICABLE):  none    IMPLANTS (IF APPLICABLE)  none    FINDINGS: none    COMPLICATIONS: none     PLAN OF CARE  -	Start Xarelto 20 mg qd tonight at 6pm  -	Start protonix 40 mg daily tonight  -	Bed rest till am  -	Admit to telemetry

## 2022-12-12 NOTE — PATIENT PROFILE ADULT - FALL HARM RISK - HARM RISK INTERVENTIONS

## 2022-12-12 NOTE — DISCHARGE NOTE PROVIDER - ATTENDING DISCHARGE PHYSICAL EXAMINATION:
Patient is s/p Afib ablation. Bilateral femoral venous access sites are clean, dry, and intact with no hematoma or tenderness. ECG shows junctional rhythm at 73 bpm with retrograde P waves. On telemetry, patient is mostly in sinus rhythm but there are episodes of isorhythmic AV dissociation and episodes of accelerated junctional rhythm. She was taking flecainide at home, but on review of medications, it seems she was not taking metoprolol (likely error in re-prescribing). Given junctional rhythm, will stop flecainide, no plan to resume metoprolol. Patient is aware.

## 2022-12-12 NOTE — PATIENT PROFILE ADULT - FUNCTIONAL SCREEN CURRENT LEVEL: COMMUNICATION, MLM
0 = understands/communicates without difficulty Chad Michaud Patient Age: 79 year old  MESSAGE: Interpreting service used: No      IM/FP- Question About an Upcoming Appointment-       Date of visit: 12/19    Question regarding: Pt is requesting to know the reason for the blood test that is ordered    Additional information: n/a    Provider's home site- [facility]- Connect call to [facility] queue         WEIGHT AND HEIGHT:   Wt Readings from Last 1 Encounters:   11/16/19 70.3 kg (155 lb)     Ht Readings from Last 1 Encounters:   11/16/19 5' 4\" (1.626 m)     BMI Readings from Last 1 Encounters:   11/16/19 26.61 kg/m²       ALLERGIES:  Iodinated diagnostic agents; Simvastatin; Lovastatin; Pneumococcal vaccines; Rosuvastatin; and Ezetimibe  Current Outpatient Medications   Medication   • HYDROcodone-acetaminophen (NORCO)  MG per tablet   • metoPROLOL succinate (TOPROL-XL) 100 MG 24 hr tablet   • metoPROLOL succinate (TOPROL-XL) 100 MG 24 hr tablet   • spironolactone (ALDACTONE) 25 MG tablet   • HYDROmorphone (DILAUDID) 2 MG tablet   • albuterol 108 (90 Base) MCG/ACT inhaler   • ramipril (ALTACE) 2.5 MG capsule   • tiZANidine (ZANAFLEX) 4 MG tablet   • predniSONE (DELTASONE) 5 MG tablet   • ALPRAZolam (XANAX) 1 MG tablet   • pantoprazole (PROTONIX) 40 MG tablet   • folic acid (FOLATE) 1 MG tablet   • aspirin 81 MG tablet   • tamsulosin (FLOMAX) 0.4 MG Cap   • atorvastatin (LIPITOR) 10 MG tablet   • Multiple Vitamins-Minerals (CENTRUM SILVER 50+MEN) Tab   • Omega-3 Fatty Acids (FISH OIL) 1200 MG capsule     No current facility-administered medications for this visit.      PHARMACY to use: please request preferred pharmacy from pt if needed             Pharmacy preference(s) on file:   [facility] - [address] [address]  Phone: [phone] Fax: [phone]    [facility] [address] [place], [place] [address] [address] [address]  Phone: [phone]  Fax: [phone]      CALL BACK INFO: DO NOT LEAVE A MESSAGE - contact patient directly  ROUTING: Patient's physician/staff        PCP: Tu Nuñez MD         INS: Payor: [employer] / Plan: GROUP MEDICARE ADV [id number] / Product Type: MEDICARE   PATIENT ADDRESS:  [address] [place] [address] Dr Dougherty [address] [place] [id number]

## 2022-12-12 NOTE — DISCHARGE NOTE PROVIDER - NSDCFUADDAPPT_GEN_ALL_CORE_FT
You will follow up with electrophysiology clinic with Dr Galindo in 1 month.   Please call the EP clinic if you don't hear from them soon.  phone: [phone]

## 2022-12-12 NOTE — PRE-ANESTHESIA EVALUATION ADULT - NSANTHOSAYNRD_GEN_A_CORE
never tested, see screening tool/No. ARMIN screening performed.  STOP BANG Legend: 0-2 = LOW Risk; 3-4 = INTERMEDIATE Risk; 5-8 = HIGH Risk

## 2022-12-12 NOTE — DISCHARGE NOTE PROVIDER - CARE PROVIDERS DIRECT ADDRESSES
[email or web address]

## 2022-12-12 NOTE — DISCHARGE NOTE PROVIDER - NSDCCPTREATMENT_GEN_ALL_CORE_FT
PRINCIPAL PROCEDURE  Procedure: Atrial ablation  Findings and Treatment: - Please start taking pantoprazole 40 mg daily  for 30 days.  - Please start taking aspirin 81 mg daily. (WATCHMAN, if not already on aspirin)  - You should restart your Xarelto 20mg daily   - You may shower today.  - Do not drive or operate heavy machinery for 3 days.  - Do not submerge in water (example: baths, swimming) for 1 week.  - No squatting, exertional activities, or lifting anything > 10 lbs for 1 week.  - Any sudden swelling, redness, fever, discharge, or severe pain, call the Electrophysiology Office at [phone].       PRINCIPAL PROCEDURE  Procedure: Atrial ablation  Findings and Treatment: - Please start taking pantoprazole 40 mg daily  for 30 days.  - You should restart your Xarelto 20mg daily   - You may shower today.  - Do not drive or operate heavy machinery for 3 days.  - Do not submerge in water (example: baths, swimming) for 1 week.  - No squatting, exertional activities, or lifting anything > 10 lbs for 1 week.  - Any sudden swelling, redness, fever, discharge, or severe pain, call the Electrophysiology Office at [phone].

## 2022-12-12 NOTE — DISCHARGE NOTE PROVIDER - HOSPITAL COURSE
Ms Gianni Baltazar is 72 year old female with history of Atrial fibrillation, S/P ZAHRA/DCCV, who present for elective A-Fib ablation.  0n 12/12/22 Patient underwent successful A-Fib ablation by Dr Galindo, then downgrade to cardiac telemetry for overnight monitoring. Xarelto 20 mg was restarted and next dose was given at 6pm on (12/12/22) post procedure.  Examination of the bilateral femoral access is C/D/I. No sign of hematoma. Bilateral femoral pulses are 2+ and bilateral dorsalis pedis pulses are 2+. Morning EKG performed showed no acute ST changes.  Patient being discharge today in a stable condition and will continue with Xarelto 20mg QD and Protonix 40 mg qd. Patient will follow up with Dr Galindo in 1 month. 72 year old female with a PMH of Raynaud's syndrome, Hypothyroidism, HTN, Atrial fibrillation s/p ZAHRA/DCCV, who present for elective A-Fib ablation.    S/p successful AFib ablation w/ Dr. Galindo on 12/12/22.  Xarelto 20mg QD was restarted at 6pm post procedure.    Patient seen and examined at the bedside on day of discharge. No complaints at this time. No AM labs. AM ECG/tele s/f ? junctional rhythm. As d/w Dr. Galindo, ECG appears to be an accelerated junctional rhythm @ 73bpm with retrograde P waves. Pt has been instructed to STOP home Flecainide. Examination of the bilateral femoral access is C/D/I. No sign of hematoma. Bilateral femoral pulses are 2+ and bilateral dorsalis pedis pulses are 2+. As per Dr. Bean, patient is stable for discharge home on Losartan 100mg QD, HCTZ 25mg QD, Xarelto 20mg QD, Pantoprazole 40mg QD x 30 days with instruction to follow up with Dr. Galindo in 1 month.     72 year old female with a PMH of Raynaud's syndrome, Hypothyroidism, HTN, Atrial fibrillation s/p ZAHRA/DCCV, who presented for elective A-Fib ablation.    S/p successful AFib ablation w/ Dr. Galindo on 12/12/22.  Xarelto 20mg QD was restarted at 6pm post procedure.    Patient seen and examined at the bedside on day of discharge. No complaints at this time. No AM labs. AM ECG/tele with episodes of isorhythmic AV dissociation. As d/w Dr. Galindo, ECG appears to be an accelerated junctional rhythm @ 73bpm with retrograde P waves. Pt has been instructed to STOP home Flecainide. Examination of the bilateral femoral access is C/D/I. No sign of hematoma. Bilateral femoral pulses are 2+ and bilateral dorsalis pedis pulses are 2+. As per Dr. Bean, patient is stable for discharge home on Losartan 100mg QD, HCTZ 25mg QD, Xarelto 20mg QD, Pantoprazole 40mg QD x 30 days with instruction to follow up with Dr. Galindo in 1 month.

## 2022-12-13 ENCOUNTER — TRANSCRIPTION ENCOUNTER (OUTPATIENT)
Age: 72
End: 2022-12-13

## 2022-12-13 VITALS
OXYGEN SATURATION: 97 % | DIASTOLIC BLOOD PRESSURE: 55 MMHG | SYSTOLIC BLOOD PRESSURE: 111 MMHG | TEMPERATURE: 98 F | HEART RATE: 78 BPM | RESPIRATION RATE: 18 BRPM

## 2022-12-13 PROCEDURE — 99239 HOSP IP/OBS DSCHRG MGMT >30: CPT

## 2022-12-13 RX ORDER — FLECAINIDE ACETATE 50 MG
1 TABLET ORAL
Qty: 0 | Refills: 0 | DISCHARGE

## 2022-12-13 RX ORDER — PANTOPRAZOLE SODIUM 20 MG/1
1 TABLET, DELAYED RELEASE ORAL
Qty: 30 | Refills: 0
Start: 2022-12-13 | End: 2023-01-11

## 2022-12-13 RX ADMIN — PANTOPRAZOLE SODIUM 40 MILLIGRAM(S): 20 TABLET, DELAYED RELEASE ORAL at 06:27

## 2022-12-13 RX ADMIN — Medication 50 MICROGRAM(S): at 06:24

## 2022-12-13 RX ADMIN — LOSARTAN POTASSIUM 100 MILLIGRAM(S): 100 TABLET, FILM COATED ORAL at 09:41

## 2022-12-13 RX ADMIN — Medication 100 MILLIGRAM(S): at 06:23

## 2022-12-13 NOTE — PROGRESS NOTE ADULT - SUBJECTIVE AND OBJECTIVE BOX
INTERVAL HPI/OVERNIGHT EVENTS:    Patient s/p Afib ablation.   No events over night  Patient in NSR    MEDICATIONS  (STANDING):  flecainide 100 milliGRAM(s) Oral every 12 hours  hydrochlorothiazide 25 milliGRAM(s) Oral daily  levothyroxine 50 MICROGram(s) Oral daily  losartan 100 milliGRAM(s) Oral daily  pantoprazole    Tablet 40 milliGRAM(s) Oral before breakfast  rivaroxaban 20 milliGRAM(s) Oral with dinner    MEDICATIONS  (PRN):    Allergies    Fructose (Other)  No Known Drug Allergies    Intolerances    ACE inhibitors (Other)    Vital Signs Last 24 Hrs  T(C): 36.8 (13 Dec 2022 07:54), Max: 36.8 (12 Dec 2022 23:55)  T(F): 98.2 (13 Dec 2022 07:54), Max: 98.2 (12 Dec 2022 23:55)  HR: 78 (13 Dec 2022 07:54) (67 - 78)  BP: 111/55 (13 Dec 2022 07:54) (103/56 - 126/86)  BP(mean): 78 (13 Dec 2022 07:54) (74 - 102)  RR: 18 (13 Dec 2022 07:54) (18 - 20)  SpO2: 97% (13 Dec 2022 07:54) (97% - 98%)    Parameters below as of 12 Dec 2022 15:36  Patient On (Oxygen Delivery Method): room air    HEENT MIKE EOMI  Lung CTAB  Heart RRR normal S1 S2  abd +BS soft  groins No hematoma, no bleeding;  Ext no C/C/E    LABS:

## 2022-12-13 NOTE — DISCHARGE NOTE NURSING/CASE MANAGEMENT/SOCIAL WORK - NSDCFUADDAPPT_GEN_ALL_CORE_FT
You will follow up with electrophysiology clinic with Dr Galindo in 1 month.   Please call the EP clinic if you don't hear from them soon.  phone: [phone]

## 2022-12-13 NOTE — PROGRESS NOTE ADULT - ASSESSMENT
A/P  Patient S/P  Afib Ablation  - continue Xarelto   - protonix 40 mg daily x 30 days  - No heavy lifting x 1 week  - shower today is ok  - follow up with EP in 3-4 weeks  - ok for discharge home today

## 2022-12-13 NOTE — DISCHARGE NOTE NURSING/CASE MANAGEMENT/SOCIAL WORK - PATIENT PORTAL LINK FT
You can access the FollowMyHealth Patient Portal offered by [facility] by registering at the following website: [email or web address]. By joining [employer]’s FollowMyHealth portal, you will also be able to view your health information using other applications (apps) compatible with our system.

## 2022-12-14 DIAGNOSIS — I48.0 PAROXYSMAL ATRIAL FIBRILLATION: ICD-10-CM

## 2022-12-23 DIAGNOSIS — I10 ESSENTIAL (PRIMARY) HYPERTENSION: ICD-10-CM

## 2022-12-23 DIAGNOSIS — I48.91 UNSPECIFIED ATRIAL FIBRILLATION: ICD-10-CM

## 2022-12-23 DIAGNOSIS — I73.00 RAYNAUD'S SYNDROME WITHOUT GANGRENE: ICD-10-CM

## 2022-12-23 DIAGNOSIS — E03.9 HYPOTHYROIDISM, UNSPECIFIED: ICD-10-CM

## 2023-01-08 ENCOUNTER — TRANSCRIPTION ENCOUNTER (OUTPATIENT)
Age: 73
End: 2023-01-08

## 2023-01-27 NOTE — HISTORY OF PRESENT ILLNESS
[facility] Hospital Medicine  Discharge Summary      Patient Name: Jason Perdue  MRN: [id number]  ANGEL: [id number]  Patient Class: OP- Observation  Admission Date: 1/24/2023  Discharge Date and Time: 1/26/2023  4:17 PM  Attending Physician: Chantel att. providers found   Discharging Provider: Monet Fuller MD  Primary Care Provider: Primary Doctor Chantel    Primary Care Team: Networked reference to record PCT     HPI:   Jason Perdue is a 60 y.o. male who with a history of atrial fibrillation on Eliquis, CHF (11/29/21 LVEF 20-25%), HIV (Biktarvy, Tivicay, Descovy, Bactrim), HTN, CAD s/p PCI 2019, Diffuse large B cell lymphoma in remission,  who presented to [facility] ED on 1/24/2023 with a primary complaint of 2 day history of worsening SOB, LOVING, and orthopnea.     Patient's is ambulatory, 1-2 miles, and does not have any orthopnea at baseline. Over the past 2 days, he has gained 5 pounds and has been reporting orthopnea and LOVING. Can only walk 100 feet. Also reports white sputum production. Has been out of his meds for 2 months, and is currently only taking lasix 60 and eliquis. Reports non-radiating chest pain from coughing, and it is worse with coughing. Denies fevers and chills, abdominal pain, nausea/vomiting/diarrhea.      In the ED, patient was resting uncomfortably in bed. Vitals Tmax 98.4F, , /81, RR 21, 97% on RA. Physical examination showed irregularly irregular rhythm, no murmurs, rubs, or gallops appreciated, no crackles appreciated, JVD, no lower extremity edema noted. Labs significant for BNP 4300, , trop of 0.075. CXR showed increasing interstitial opacities in RLL.     Hospital Course:   In the ED, Vitals Tmax 98.4F, , /81, RR 21, 97% on RA. Physical examination showed irregularly irregular rhythm, JVD, no lower extremity edema noted. Labs significant for BNP 4300, , trop of 0.075. CXR showed increasing interstitial opacities in RLL.   Pt admitted to medicine  service. Troponin's trended to peak with no EKG changes. medications resumed. Plavix held as pt no need or triple therapy. ASA and Eliquis continued. Cardiology consulted, recommending stress echo which showed decreased EF 10 % without ischemia. Entresto initiated; unable to titrate dose due to blood pressure. Pt in need of TAVR. Will need control HIV and medication compliance prior to possible procedure.  Will discharge home with new medication Entresto and Cardiology follow up. Pt will also need close f/u with ID. Continue prophylactic bactrim.     Goals of Care Treatment Preferences:  Code Status: Full Code      Consults:   Consults (From admission, onward)        Status Ordering Provider     Inpatient consult to Cardiology  Once        Provider:  Skinny Barakat MD    Completed JOSE COOLEY new Assessment & Plan notes have been filed under this hospital service since the last note was generated.  Service: Hospital Medicine    Final Active Diagnoses:    Diagnosis Date Noted POA    PRINCIPAL PROBLEM:  Acute on chronic congestive heart failure [I50.9] 01/26/2023 Yes    Chest pain [R07.9] 01/26/2023 Yes    HIV disease [B20] 05/24/2022 Yes      Problems Resolved During this Admission:       Discharged Condition: good    Disposition: Home or Self Care    Follow Up:   Follow-up Information     [facility]. Go on 3/9/2023.    Specialty: Cardiology  Contact information:  [address] [place] [id number] [phone]           [facility] - Infectious Disease Follow up.    Specialty: Infectious Diseases  Contact information:  [address] [place] [id number] [phone]           [facility] - Family Medicine Follow up in 2 week(s).    Specialty: Family Medicine  Contact information:  [address] [place] [address] [phone]                     Patient Instructions:      Ambulatory referral/consult to  Cardiology   Standing Status: Future   Referral Priority: Urgent Referral Type: Consultation   Referral Reason: Specialty Services Required   Requested Specialty: Cardiology   Number of Visits Requested: 1     Ambulatory referral/consult to Infectious Disease   Standing Status: Future   Referral Priority: Urgent Referral Type: Consultation   Referral Reason: Specialty Services Required   Requested Specialty: Infectious Diseases   Number of Visits Requested: 1       Significant Diagnostic Studies: Labs:   CMP   Recent Labs   Lab 01/26/23  0335   *   K 3.9   CO2 25   BUN 19.0   CREATININE 1.23*   CALCIUM 8.8   ALBUMIN 2.7*   BILITOT 0.6   ALKPHOS 52   AST 27   ALT 16   , CBC   Recent Labs   Lab 01/26/23  0335   WBC 7.0   HGB 12.9*   HCT 41.1*      , INR   Lab Results   Component Value Date    INR 1.19 12/26/2018    PROTIME 15.0 (H) 12/26/2018   , Lipid Panel   Lab Results   Component Value Date    CHOL 168 01/24/2023    HDL 28 (L) 01/24/2023    TRIG 101 01/24/2023    and Troponin   Recent Labs   Lab 01/24/23  1133 01/24/23  1703 01/24/23  2307   TROPONINI 0.079* 0.092* 0.082*     Radiology: X-Ray: CXR: X-Ray Chest 1 View (CXR):   Results for orders placed or performed during the hospital encounter of 01/24/23   X-Ray Chest 1 View    Narrative    EXAMINATION:  XR CHEST 1 VIEW    CLINICAL HISTORY:  Dyspnea;    TECHNIQUE:  AP chest    COMPARISON:  Chest x-ray dated 01/03/2023    FINDINGS:  Right chest wall port catheter is unchanged.  The heart is stable in size.  There are increased interstitial opacities in the right lower lung.  There is no pleural effusion or visible pneumothorax.      Impression    Increasing interstitial opacities in the right lower lung, may be infectious or inflammatory.      Electronically signed by: Nae Burden  Date:    01/24/2023  Time:    06:21    and X-Ray Chest PA and Lateral (CXR): No results found for this visit on 01/24/23.    Pending Diagnostic Studies:     Procedure  [FreeTextEntry1] : 70-yo female with h/o HTN, persistent A-fib. S/p ZAHRA/CV at [facility] in December 2019. Denies CP, SOB, palpitations.\par \par BP has been controlled. HR has been slow at times but better since last visit. Component Value Units Date/Time    EKG 12-lead [[id number]]     Order Status: Sent Lab Status: No result     Urinalysis, Reflex to Urine Culture [[id number]] Collected: 01/24/23 1147    Order Status: Sent Lab Status: No result     Specimen: Urine          Medications:  Reconciled Home Medications:      Medication List      START taking these medications    atorvastatin 40 MG tablet  Commonly known as: LIPITOR  Take 1 tablet (40 mg total) by mouth every evening.     sacubitriL-valsartan 24-26 mg per tablet  Commonly known as: ENTRESTO  Take 1 tablet by mouth 2 (two) times daily.        CHANGE how you take these medications    apixaban 5 mg Tab  Commonly known as: ELIQUIS  Take 1 tablet (5 mg total) by mouth 2 (two) times daily.  What changed: when to take this     aspirin 81 MG EC tablet  Commonly known as: ECOTRIN  Take 1 tablet (81 mg total) by mouth once daily.  What changed: when to take this     carvediloL 12.5 MG tablet  Commonly known as: COREG  Take 1 tablet (12.5 mg total) by mouth 2 (two) times daily.  What changed: when to take this     furosemide 20 MG tablet  Commonly known as: LASIX  Take 3 tablets (60 mg total) by mouth once daily.  What changed: when to take this     spironolactone 25 MG tablet  Commonly known as: ALDACTONE  Take 1 tablet (25 mg total) by mouth once daily.  What changed: See the new instructions.        CONTINUE taking these medications    BIKTARVY -25 mg (25 kg or greater)  Generic drug: iyimfrvgm-imhoaiye-eidvxfe ala  Take by mouth.     diltiaZEM 360 MG Cs24  Commonly known as: TIAZAC  Take 360 mg by mouth.     dolutegravir 50 mg Tab  Commonly known as: TIVICAY  Take 50 mg by mouth.     emtricitabine-tenofovir alafen 200-25 mg Tab  Commonly known as: DESCOVY  Take 1 tablet by mouth once daily.     famotidine 20 MG tablet  Commonly known as: PEPCID  Take 1 tablet (20 mg total) by mouth 2 (two) times daily.     food supplemt, lactose-reduced Liqd  Take 1 Bottle by mouth.      megestroL 400 mg/10 mL (40 mg/mL) Susp  Commonly known as: MEGACE  Take 200 mg by mouth.     morphine 15 MG tablet  Commonly known as: MSIR  Take 15 mg by mouth.     polyethylene glycol 3350(bulk) Powd  by Other route.     sulfamethoxazole-trimethoprim 800-160mg 800-160 mg Tab  Commonly known as: BACTRIM DS  Take 1 tablet by mouth once daily.     VITAMIN D2 50,000 unit Cap  Generic drug: ergocalciferol  Take 50,000 Units by mouth every 7 days.        STOP taking these medications    clopidogreL 75 mg tablet  Commonly known as: PLAVIX     losartan 25 MG tablet  Commonly known as: COZAAR            Indwelling Lines/Drains at time of discharge:   Lines/Drains/Airways     None                 Time spent on the discharge of patient: >30 minutes         Monet Fuller MD  Department of Hospital Medicine  [facility] - ICU

## 2023-01-31 ENCOUNTER — APPOINTMENT (OUTPATIENT)
Dept: ELECTROPHYSIOLOGY | Facility: CLINIC | Age: 73
End: 2023-01-31
Payer: MEDICARE

## 2023-01-31 VITALS
HEART RATE: 69 BPM | RESPIRATION RATE: 14 BRPM | BODY MASS INDEX: 35.31 KG/M2 | HEIGHT: 68 IN | WEIGHT: 233 LBS | TEMPERATURE: 97 F | DIASTOLIC BLOOD PRESSURE: 80 MMHG | SYSTOLIC BLOOD PRESSURE: 170 MMHG

## 2023-01-31 VITALS — DIASTOLIC BLOOD PRESSURE: 78 MMHG | SYSTOLIC BLOOD PRESSURE: 167 MMHG

## 2023-01-31 PROCEDURE — 93000 ELECTROCARDIOGRAM COMPLETE: CPT

## 2023-01-31 PROCEDURE — 99214 OFFICE O/P EST MOD 30 MIN: CPT

## 2023-01-31 RX ORDER — FLECAINIDE ACETATE 100 MG/1
100 TABLET ORAL
Qty: 180 | Refills: 1 | Status: COMPLETED | COMMUNITY
End: 2023-01-31

## 2023-03-18 NOTE — HISTORY OF PRESENT ILLNESS
[FreeTextEntry1] : Patient is a very pleasant 72 year old woman with history of hypertension, hyperthyroidism, and persistent atrial fibrillation for the past 6-8 months. Patient recently underwent cardioversion back to sinus rhythm and had failed flecainide in the past. \par \par Patient S/P AF Cryoablation on 12/12/2022, CHADSVASC OF 3. Patient comes for follow-up after the Ablation and is feeling great.  \par \par EKG (1/31/2023) sinus rhythm 69 BMP with First Degree AV Block. \par \par \par \par

## 2023-03-18 NOTE — ADDENDUM
[FreeTextEntry1] : I, Jack Gongora assisted in documentation on 01/31/2023 acting as a scribe for Dr. Nilton Galindo.\par

## 2023-03-18 NOTE — ASSESSMENT
[FreeTextEntry1] : Atrial Fibrillation\par - CHADSVASC OF 3, S/P AF Ablation.\par - Patient is in normal sinus rhythm, doing great no palpitation. \par - Continue Xarelto 50 mg QD, no bleeding.\par - Patient is no longer on Flecainide and is doing great on all antiarrhythmic drugs. \par \par Hypertension\par - Patient blood pressure is well controlled. \par - Continue Losartan 100 mg QD.\par - Continue Hydrochlorothiazide 25 mg QD.

## 2023-04-25 ENCOUNTER — APPOINTMENT (OUTPATIENT)
Dept: CARDIOLOGY | Facility: CLINIC | Age: 73
End: 2023-04-25
Payer: MEDICARE

## 2023-04-25 VITALS
SYSTOLIC BLOOD PRESSURE: 130 MMHG | DIASTOLIC BLOOD PRESSURE: 70 MMHG | HEART RATE: 89 BPM | BODY MASS INDEX: 34.86 KG/M2 | HEIGHT: 68 IN | WEIGHT: 230 LBS

## 2023-04-25 PROCEDURE — 99213 OFFICE O/P EST LOW 20 MIN: CPT

## 2023-04-25 PROCEDURE — 93000 ELECTROCARDIOGRAM COMPLETE: CPT

## 2023-04-25 NOTE — HISTORY OF PRESENT ILLNESS
[FreeTextEntry1] : 72-yo female with h/o HTN, persistent A-fib. S/p ZAHRA/CV at [facility] in December 2019. \par \par Patient is s/p DCCV on 0913/2022, remains in NSR. She went back to Flecainide 50 mg PO BID due to slow HR (40 bpm). She feels well, denies chest pain, reports increased energy level. Her ZAHRA at the time of DCCV showed moderate MR.\par \par 10/25/2022: Patient was seen by EP, is going for A-fib ablation in December 2022. She is tolerating Flecainide 100 mg BID well, no palpitations, no bradycardia. BP remains borderline elevated  on Losartan 100 mg and HCTZ 25 mg.. Reports mild ankle swelling at the end of the day. \par \par 4/24/23:\par Pt presents for a follow up visit. She is s/p A-fib ablation in December 2022, she is off Flecainide. Leg edema improved. Denies chest pain or ANGULO. No palpitations.

## 2023-04-25 NOTE — ASSESSMENT
[FreeTextEntry1] : 72-yo female.\par HTN controlled.\par Persistent A-fib, s/p ablation.\par CHADS Vasc score 3.\par Leg edema has improved.\par \par \par Plan:\par Continue Xarelto.\par Continue Losartan 100 mg/HCTZ 25 mg daily.\par Furosemide 20 mg as needed for edema.\par F/U in 6 months.\par \par Ferdinand Suarez MD\par

## 2023-04-25 NOTE — REVIEW OF SYSTEMS
[Feeling Fatigued] : feeling fatigued [Negative] : Heme/Lymph [Fever] : no fever [Chills] : no chills [Dyspnea on exertion] : not dyspnea during exertion [Chest Discomfort] : no chest discomfort [Lower Ext Edema] : no extremity edema [Leg Claudication] : no intermittent leg claudication [Palpitations] : no palpitations [Orthopnea] : no orthopnea [Rash] : no rash [Anxiety] : no anxiety [Easy Bleeding] : no tendency for easy bleeding [Easy Bruising] : no tendency for easy bruising

## 2023-04-25 NOTE — CARDIOLOGY SUMMARY
[de-identified] : 4/25/23:\par SR 89/min, LAFB with poor R wave progression\par  [de-identified] : 06/21/2022 - Normal LVEF 62%, normal diastolic function, mild thickening of aortic valve leaflets.

## 2023-04-26 ENCOUNTER — RESULT CHARGE (OUTPATIENT)
Age: 73
End: 2023-04-26

## 2023-04-27 NOTE — BRIEF OPERATIVE NOTE - NSICDXBRIEFOPLAUNCH_GEN_ALL_CORE
----- Message from Cordelia Bautista sent at 4/27/2023  1:51 PM CDT -----  Regarding: Consult  Contact: TRUPTI VERDUZCO [[id number]]  Name of Who is Calling: Trupti Verduzco            What is the request in detail: Pt states she is requesting to be seen sooner then 9/7.  She states she has seen an appt online but it is not letting her schedule. She  states she is seeing  Dr. Hi  now and wants to switch provides , asap . Please advise           Can the clinic reply by [facility]:   No         What Number to Call Back if not in [facility]: Home Phone      [phone]  Work Phone      Not on file.  Mobile          [phone]         <--- Click to Launch ICDx for PreOp, PostOp and Procedure

## 2023-05-15 ENCOUNTER — RX RENEWAL (OUTPATIENT)
Age: 73
End: 2023-05-15

## 2023-06-10 ENCOUNTER — RX RENEWAL (OUTPATIENT)
Age: 73
End: 2023-06-10

## 2023-06-28 ENCOUNTER — APPOINTMENT (OUTPATIENT)
Dept: ELECTROPHYSIOLOGY | Facility: CLINIC | Age: 73
End: 2023-06-28
Payer: MEDICARE

## 2023-06-28 VITALS
BODY MASS INDEX: 35.79 KG/M2 | WEIGHT: 228 LBS | DIASTOLIC BLOOD PRESSURE: 81 MMHG | HEART RATE: 69 BPM | SYSTOLIC BLOOD PRESSURE: 145 MMHG | HEIGHT: 67 IN | TEMPERATURE: 98 F

## 2023-06-28 DIAGNOSIS — R00.2 PALPITATIONS: ICD-10-CM

## 2023-06-28 DIAGNOSIS — E03.9 HYPOTHYROIDISM, UNSPECIFIED: ICD-10-CM

## 2023-06-28 PROCEDURE — 93000 ELECTROCARDIOGRAM COMPLETE: CPT

## 2023-06-28 PROCEDURE — 99215 OFFICE O/P EST HI 40 MIN: CPT

## 2023-07-03 ENCOUNTER — RX RENEWAL (OUTPATIENT)
Age: 73
End: 2023-07-03

## 2023-07-06 ENCOUNTER — OUTPATIENT (OUTPATIENT)
Dept: OUTPATIENT SERVICES | Facility: HOSPITAL | Age: 73
LOS: 1 days | End: 2023-07-06
Payer: MEDICARE

## 2023-07-06 VITALS
DIASTOLIC BLOOD PRESSURE: 67 MMHG | TEMPERATURE: 99 F | WEIGHT: 227.96 LBS | HEIGHT: 68 IN | OXYGEN SATURATION: 99 % | RESPIRATION RATE: 15 BRPM | HEART RATE: 67 BPM | SYSTOLIC BLOOD PRESSURE: 143 MMHG

## 2023-07-06 DIAGNOSIS — Z90.89 ACQUIRED ABSENCE OF OTHER ORGANS: Chronic | ICD-10-CM

## 2023-07-06 DIAGNOSIS — I48.19 OTHER PERSISTENT ATRIAL FIBRILLATION: ICD-10-CM

## 2023-07-06 DIAGNOSIS — Z90.710 ACQUIRED ABSENCE OF BOTH CERVIX AND UTERUS: Chronic | ICD-10-CM

## 2023-07-06 DIAGNOSIS — Z98.890 OTHER SPECIFIED POSTPROCEDURAL STATES: Chronic | ICD-10-CM

## 2023-07-06 DIAGNOSIS — Z01.818 ENCOUNTER FOR OTHER PREPROCEDURAL EXAMINATION: ICD-10-CM

## 2023-07-06 LAB
ALBUMIN SERPL ELPH-MCNC: 4.5 G/DL — SIGNIFICANT CHANGE UP (ref 3.5–5.2)
ALP SERPL-CCNC: 107 U/L — SIGNIFICANT CHANGE UP (ref 30–115)
ALT FLD-CCNC: 15 U/L — SIGNIFICANT CHANGE UP (ref 0–41)
ANION GAP SERPL CALC-SCNC: 11 MMOL/L — SIGNIFICANT CHANGE UP (ref 7–14)
APTT BLD: 38.8 SEC — SIGNIFICANT CHANGE UP (ref 27–39.2)
AST SERPL-CCNC: 20 U/L — SIGNIFICANT CHANGE UP (ref 0–41)
BASOPHILS # BLD AUTO: 0.03 K/UL — SIGNIFICANT CHANGE UP (ref 0–0.2)
BASOPHILS NFR BLD AUTO: 0.5 % — SIGNIFICANT CHANGE UP (ref 0–1)
BILIRUB SERPL-MCNC: 0.5 MG/DL — SIGNIFICANT CHANGE UP (ref 0.2–1.2)
BUN SERPL-MCNC: 15 MG/DL — SIGNIFICANT CHANGE UP (ref 10–20)
CALCIUM SERPL-MCNC: 9.3 MG/DL — SIGNIFICANT CHANGE UP (ref 8.4–10.5)
CHLORIDE SERPL-SCNC: 99 MMOL/L — SIGNIFICANT CHANGE UP (ref 98–110)
CO2 SERPL-SCNC: 29 MMOL/L — SIGNIFICANT CHANGE UP (ref 17–32)
CREAT SERPL-MCNC: 0.8 MG/DL — SIGNIFICANT CHANGE UP (ref 0.7–1.5)
EGFR: 78 ML/MIN/1.73M2 — SIGNIFICANT CHANGE UP
EOSINOPHIL # BLD AUTO: 0.1 K/UL — SIGNIFICANT CHANGE UP (ref 0–0.7)
EOSINOPHIL NFR BLD AUTO: 1.6 % — SIGNIFICANT CHANGE UP (ref 0–8)
GLUCOSE SERPL-MCNC: 100 MG/DL — HIGH (ref 70–99)
HCT VFR BLD CALC: 42.5 % — SIGNIFICANT CHANGE UP (ref 37–47)
HGB BLD-MCNC: 14.4 G/DL — SIGNIFICANT CHANGE UP (ref 12–16)
IMM GRANULOCYTES NFR BLD AUTO: 0.3 % — SIGNIFICANT CHANGE UP (ref 0.1–0.3)
INR BLD: 1.2 RATIO — SIGNIFICANT CHANGE UP (ref 0.65–1.3)
LYMPHOCYTES # BLD AUTO: 1.77 K/UL — SIGNIFICANT CHANGE UP (ref 1.2–3.4)
LYMPHOCYTES # BLD AUTO: 27.7 % — SIGNIFICANT CHANGE UP (ref 20.5–51.1)
MCHC RBC-ENTMCNC: 31.9 PG — HIGH (ref 27–31)
MCHC RBC-ENTMCNC: 33.9 G/DL — SIGNIFICANT CHANGE UP (ref 32–37)
MCV RBC AUTO: 94.2 FL — SIGNIFICANT CHANGE UP (ref 81–99)
MONOCYTES # BLD AUTO: 0.66 K/UL — HIGH (ref 0.1–0.6)
MONOCYTES NFR BLD AUTO: 10.3 % — HIGH (ref 1.7–9.3)
NEUTROPHILS # BLD AUTO: 3.82 K/UL — SIGNIFICANT CHANGE UP (ref 1.4–6.5)
NEUTROPHILS NFR BLD AUTO: 59.6 % — SIGNIFICANT CHANGE UP (ref 42.2–75.2)
NRBC # BLD: 0 /100 WBCS — SIGNIFICANT CHANGE UP (ref 0–0)
PLATELET # BLD AUTO: 177 K/UL — SIGNIFICANT CHANGE UP (ref 130–400)
PMV BLD: 11.8 FL — HIGH (ref 7.4–10.4)
POTASSIUM SERPL-MCNC: 3.9 MMOL/L — SIGNIFICANT CHANGE UP (ref 3.5–5)
POTASSIUM SERPL-SCNC: 3.9 MMOL/L — SIGNIFICANT CHANGE UP (ref 3.5–5)
PROT SERPL-MCNC: 6.9 G/DL — SIGNIFICANT CHANGE UP (ref 6–8)
PROTHROM AB SERPL-ACNC: 13.7 SEC — HIGH (ref 9.95–12.87)
RBC # BLD: 4.51 M/UL — SIGNIFICANT CHANGE UP (ref 4.2–5.4)
RBC # FLD: 12.3 % — SIGNIFICANT CHANGE UP (ref 11.5–14.5)
SODIUM SERPL-SCNC: 139 MMOL/L — SIGNIFICANT CHANGE UP (ref 135–146)
WBC # BLD: 6.4 K/UL — SIGNIFICANT CHANGE UP (ref 4.8–10.8)
WBC # FLD AUTO: 6.4 K/UL — SIGNIFICANT CHANGE UP (ref 4.8–10.8)

## 2023-07-06 PROCEDURE — 99214 OFFICE O/P EST MOD 30 MIN: CPT | Mod: 25

## 2023-07-06 PROCEDURE — 93010 ELECTROCARDIOGRAM REPORT: CPT

## 2023-07-06 PROCEDURE — 36415 COLL VENOUS BLD VENIPUNCTURE: CPT

## 2023-07-06 PROCEDURE — 85025 COMPLETE CBC W/AUTO DIFF WBC: CPT

## 2023-07-06 PROCEDURE — 85730 THROMBOPLASTIN TIME PARTIAL: CPT

## 2023-07-06 PROCEDURE — 93005 ELECTROCARDIOGRAM TRACING: CPT

## 2023-07-06 PROCEDURE — 80053 COMPREHEN METABOLIC PANEL: CPT

## 2023-07-06 PROCEDURE — 85610 PROTHROMBIN TIME: CPT

## 2023-07-06 NOTE — H&P PST ADULT - REASON FOR ADMISSION
Proceduralist: Ferdinand Suarez  Procedure: ZAHRA/CARDIOVERSION  Procedure: 180 Minutes  Anesthesia Type: Local Standby  PT STATES--I HAVE A H/O A FIB.  PT DENIES HAVING ANY PAIN.

## 2023-07-06 NOTE — H&P PST ADULT - HISTORY OF PRESENT ILLNESS
PT PRESENTS TO PAST WITH NO SOB, CP, PALPITATIONS, DYSURIA, UTI OR URI AT PRESENT.   PT ABLE TO WALK UP 2-3 FLIGHTS OF STEPS WITH NO SOB.  AS PER THE PT, THIS IS HIS/HER COMPLETE MEDICAL AND SURGICAL HX, INCLUDING MEDICATIONS PRESCRIBED AND OVER THE COUNTER  pt denies any covid s/s, or tested positive in the past  pt advised self quarantine till day of procedure  denies travel outside the [place] in the past 30 days  Anesthesia Alert  NO--Difficult Airway  NO--History of neck surgery or radiation  NO--Limited ROM of neck  NO--History of Malignant hyperthermia  NO--Personal or family history of Pseudocholinesterase deficiency  NO--Prior Anesthesia Complication  NO--Latex Allergy  NO--Loose teeth  NO--History of Rheumatoid Arthritis  NO--ARMIN  NO BLEEDING RISK  NO--Other_____

## 2023-07-06 NOTE — H&P PST ADULT - NSICDXPASTMEDICALHX_GEN_ALL_CORE_FT
PAST MEDICAL HISTORY:  Adult hypothyroidism     Atrial fibrillation, unspecified type cardioversion x 2    H/O Raynaud's syndrome     H/O: obesity     HTN (hypertension)     Mixed connective tissue disease ??

## 2023-07-07 DIAGNOSIS — Z01.818 ENCOUNTER FOR OTHER PREPROCEDURAL EXAMINATION: ICD-10-CM

## 2023-07-07 DIAGNOSIS — I48.19 OTHER PERSISTENT ATRIAL FIBRILLATION: ICD-10-CM

## 2023-07-12 ENCOUNTER — OUTPATIENT (OUTPATIENT)
Dept: INPATIENT UNIT | Facility: HOSPITAL | Age: 73
LOS: 1 days | Discharge: HOME | End: 2023-07-12

## 2023-07-12 DIAGNOSIS — Z90.710 ACQUIRED ABSENCE OF BOTH CERVIX AND UTERUS: Chronic | ICD-10-CM

## 2023-07-12 DIAGNOSIS — Z90.89 ACQUIRED ABSENCE OF OTHER ORGANS: Chronic | ICD-10-CM

## 2023-07-12 DIAGNOSIS — I48.19 OTHER PERSISTENT ATRIAL FIBRILLATION: ICD-10-CM

## 2023-07-12 DIAGNOSIS — Z98.890 OTHER SPECIFIED POSTPROCEDURAL STATES: Chronic | ICD-10-CM

## 2023-07-12 NOTE — CHART NOTE - NSCHARTNOTEFT_GEN_A_CORE
Pt presented for ZAHRA/DCCV for Afib.  She currently denies any symptoms of angina, dyspnea, or palpitations.  Pt found to be in NSR on 12-lead ECG on presentation.  ZAHRA d/c'ed.

## 2023-07-26 NOTE — DISCUSSION/SUMMARY
[EKG obtained to assist in diagnosis and management of assessed problem(s)] : EKG obtained to assist in diagnosis and management of assessed problem(s) [FreeTextEntry1] : I discussed with patient the different management strategies of atrial fibrillation including rate control, rhythm control and ablative therapy. I also discussed with the patient in great length the role of anticoagulation in stroke prevention. Patient expressed understanding of the discussion. Patient is interested in restoring sinus rhythm through DC cardioversion. \par \par I explained that she will need a ZAHRA prior to cardioversion to rule out thrombus. I discussed risks and benefits of DC cardioversion including risk of aspiration, skin burn, stroke, and cardiac arrest. She expressed understanding and would like to proceed with cardioversion. My office staff will contact patient the patient with date, time and instructions.

## 2023-07-26 NOTE — HISTORY OF PRESENT ILLNESS
[FreeTextEntry1] : \par EP: Dr. Galindo\par Cardio: Dr. Suarez\par \par 71 yo F with h/o hypertension, hyperthyroidism, and persistent atrial fibrillation for the past 6-8 months s/p cardioversion back to sinus rhythm and had failed flecainide in the past. Had AF Cryoablation on 12/12/2022, CHADSVASC OF 3. After ablation, she felt great. \par \par She is here today for routine follow up visit. Went back into AFib. Asymptomatic and rate controlled. She denies chest pain, dyspnea, palpitations, dizziness or syncope.\par

## 2023-07-26 NOTE — CARDIOLOGY SUMMARY
[de-identified] : 6/28/2023: AFib with cont VR (HR 69 bpm). PVCs, LAD, QTc 410  msec [de-identified] : 6/21/2022 - Normal LVEF 62%, normal diastolic function, mild thickening of aortic valve leaflets.

## 2023-07-26 NOTE — ASSESSMENT
[FreeTextEntry1] : # Atrial Fibrillation\par - CHADSVASC 3 (HTN, Age > 73, F) s/p AF Ablation 12/2022.\par - Patient is back in AFib (asymptomatic). Rec ZAHRA/CV. \par - Continue Xarelto 20 mg QD, no bleeding.\par - Patient is no longer on Flecainide.\par \par # Hypertension\par - Patient's blood pressure is elevated today.\par - Continue Losartan 100 mg QD.\par - Continue Hydrochlorothiazide 25 mg QD. \par - Low sodium diet and weight loss is advised\par \par I have also advised the patient to go to the nearest emergency room if she experiences any chest pain, dyspnea, syncope, or has any other compelling symptoms.\par \par Follow up in 2 mo

## 2023-07-26 NOTE — PHYSICAL EXAM
[Well Developed] : well developed [Well Nourished] : well nourished [No Acute Distress] : no acute distress [Normal Conjunctiva] : normal conjunctiva [Normal S1, S2] : normal S1, S2 [No Murmur] : no murmur [Clear Lung Fields] : clear lung fields [Good Air Entry] : good air entry [No Respiratory Distress] : no respiratory distress  [Soft] : abdomen soft [Normal Gait] : normal gait [No Edema] : no edema [No Rash] : no rash [Moves all extremities] : moves all extremities [Normal Speech] : normal speech [Alert and Oriented] : alert and oriented [Obese] : obese [de-identified] : irregular rate and rhythm

## 2023-08-11 ENCOUNTER — APPOINTMENT (OUTPATIENT)
Dept: ELECTROPHYSIOLOGY | Facility: CLINIC | Age: 73
End: 2023-08-11
Payer: MEDICARE

## 2023-08-11 VITALS
HEIGHT: 68 IN | WEIGHT: 230 LBS | DIASTOLIC BLOOD PRESSURE: 74 MMHG | TEMPERATURE: 98 F | SYSTOLIC BLOOD PRESSURE: 146 MMHG | HEART RATE: 66 BPM | BODY MASS INDEX: 34.86 KG/M2

## 2023-08-11 DIAGNOSIS — R00.1 BRADYCARDIA, UNSPECIFIED: ICD-10-CM

## 2023-08-11 PROBLEM — Z86.39 PERSONAL HISTORY OF OTHER ENDOCRINE, NUTRITIONAL AND METABOLIC DISEASE: Chronic | Status: ACTIVE | Noted: 2023-07-06

## 2023-08-11 PROCEDURE — 99214 OFFICE O/P EST MOD 30 MIN: CPT

## 2023-08-11 PROCEDURE — 93000 ELECTROCARDIOGRAM COMPLETE: CPT

## 2023-08-28 NOTE — HISTORY OF PRESENT ILLNESS
[FreeTextEntry1] : Patient is a very pleasant 72 year old woman with history of hypertension, hyperthyroidism, and persistent atrial fibrillation for the past 6-8 months. Patient recently underwent cardioversion back to sinus rhythm and had failed flecainide in the past.   Patient S/P AF Cryoablation on 12/12/2022, CHADSVASC OF 3. Patient comes for follow-up after the Ablation and is feeling great.    Patient is currently in normal sinus rhythm. A few months ago, patient was in AF but converted spontaneously to normal sinus rhythm. Patient does complain of some tiredness.      EKG (08/11/2023): Sinus rhythm at 66 bpm with junctional beats and PVC, left axis deviation. EKG (1/31/2023) sinus rhythm 69 BMP with First Degree AV Block.   ZAHRA (09/13/2022): Ejection fraction of 55-60%, mildly enlarged left atrium, no major valvular disease.

## 2023-08-28 NOTE — ADDENDUM
[FreeTextEntry1] : Veronika WHITING assisted in documentation on 08/11/2023   acting as a scribe for Dr. Nilton Galindo.

## 2023-08-28 NOTE — ASSESSMENT
[FreeTextEntry1] : Atrial Fibrillation - in NSR today - CHADSVASC OF 3, S/P AF Ablation. - Patient is in normal sinus rhythm, doing great no palpitation.  - Continue Xarelto 50 mg QD, no bleeding.  Hypertension - Patient blood pressure is well controlled.  - Continue Losartan 100 mg QD. - Continue Hydrochlorothiazide 25 mg QD.   Junctional Rhythm  - Patient found to have junctional bradycardia on EKG.  - Patient has preserved LV function.  - Patient is currently not taking any AV jami blocking agents.  - Event monitor to evaluate patient's average heart rate and the possibility of her weakness due to bradycardia.  - Recommend to obtain TSH to evaluate patient's thyroid function.

## 2023-09-11 ENCOUNTER — RX RENEWAL (OUTPATIENT)
Age: 73
End: 2023-09-11

## 2023-10-11 ENCOUNTER — RX RENEWAL (OUTPATIENT)
Age: 73
End: 2023-10-11

## 2023-10-13 ENCOUNTER — APPOINTMENT (OUTPATIENT)
Dept: ELECTROPHYSIOLOGY | Facility: CLINIC | Age: 73
End: 2023-10-13
Payer: MEDICARE

## 2023-10-13 VITALS
TEMPERATURE: 97.1 F | DIASTOLIC BLOOD PRESSURE: 74 MMHG | WEIGHT: 230 LBS | HEIGHT: 68 IN | BODY MASS INDEX: 34.86 KG/M2 | SYSTOLIC BLOOD PRESSURE: 122 MMHG | HEART RATE: 62 BPM | RESPIRATION RATE: 18 BRPM

## 2023-10-13 DIAGNOSIS — R00.1 BRADYCARDIA, UNSPECIFIED: ICD-10-CM

## 2023-10-13 PROCEDURE — 93000 ELECTROCARDIOGRAM COMPLETE: CPT

## 2023-10-13 PROCEDURE — 99214 OFFICE O/P EST MOD 30 MIN: CPT

## 2023-10-17 ENCOUNTER — APPOINTMENT (OUTPATIENT)
Dept: CARDIOLOGY | Facility: CLINIC | Age: 73
End: 2023-10-17
Payer: MEDICARE

## 2023-10-17 VITALS
SYSTOLIC BLOOD PRESSURE: 140 MMHG | BODY MASS INDEX: 34.71 KG/M2 | WEIGHT: 229 LBS | HEIGHT: 68 IN | DIASTOLIC BLOOD PRESSURE: 60 MMHG | HEART RATE: 74 BPM

## 2023-10-17 PROCEDURE — 93000 ELECTROCARDIOGRAM COMPLETE: CPT

## 2023-10-17 PROCEDURE — 99214 OFFICE O/P EST MOD 30 MIN: CPT

## 2023-10-17 RX ORDER — FUROSEMIDE 20 MG/1
20 TABLET ORAL
Qty: 30 | Refills: 3 | Status: DISCONTINUED | COMMUNITY
Start: 2022-05-10 | End: 2023-10-17

## 2024-01-20 ENCOUNTER — NON-APPOINTMENT (OUTPATIENT)
Age: 74
End: 2024-01-20

## 2024-03-25 ENCOUNTER — RX RENEWAL (OUTPATIENT)
Age: 74
End: 2024-03-25

## 2024-04-11 NOTE — ASU PATIENT PROFILE, ADULT - NS TRANSFER DENTURES
Partial Communicate Risk of Fall with Harm to all staff/Reinforce activity limits and safety measures with patient and family/Tailored Fall Risk Interventions/Visual Cue: Yellow wristband and red socks/Bed in lowest position, wheels locked, appropriate side rails in place/Call bell, personal items and telephone in reach/Instruct patient to call for assistance before getting out of bed or chair/Non-slip footwear when patient is out of bed/[place] to call system/Physically safe environment - no spills, clutter or unnecessary equipment/Purposeful Proactive Rounding/Room/bathroom lighting operational, light cord in reach

## 2024-04-18 ENCOUNTER — APPOINTMENT (OUTPATIENT)
Dept: CARDIOLOGY | Facility: CLINIC | Age: 74
End: 2024-04-18
Payer: MEDICARE

## 2024-04-18 ENCOUNTER — APPOINTMENT (OUTPATIENT)
Dept: ELECTROPHYSIOLOGY | Facility: CLINIC | Age: 74
End: 2024-04-18
Payer: MEDICARE

## 2024-04-18 VITALS
DIASTOLIC BLOOD PRESSURE: 92 MMHG | WEIGHT: 230 LBS | HEIGHT: 68 IN | SYSTOLIC BLOOD PRESSURE: 140 MMHG | HEART RATE: 147 BPM | BODY MASS INDEX: 34.86 KG/M2

## 2024-04-18 VITALS
DIASTOLIC BLOOD PRESSURE: 92 MMHG | BODY MASS INDEX: 34.86 KG/M2 | HEART RATE: 147 BPM | SYSTOLIC BLOOD PRESSURE: 140 MMHG | WEIGHT: 230 LBS | HEIGHT: 68 IN

## 2024-04-18 DIAGNOSIS — R60.9 EDEMA, UNSPECIFIED: ICD-10-CM

## 2024-04-18 LAB
ANION GAP SERPL CALC-SCNC: 10 MMOL/L
BUN SERPL-MCNC: 22 MG/DL
CALCIUM SERPL-MCNC: 9.5 MG/DL
CHLORIDE SERPL-SCNC: 103 MMOL/L
CO2 SERPL-SCNC: 29 MMOL/L
CREAT SERPL-MCNC: 1 MG/DL
EGFR: 59 ML/MIN/1.73M2
GLUCOSE SERPL-MCNC: 97 MG/DL
INR PPP: 1.56 RATIO
POTASSIUM SERPL-SCNC: 4.2 MMOL/L
PT BLD: 17.9 SEC
SODIUM SERPL-SCNC: 142 MMOL/L
TSH SERPL-ACNC: 3.46 UIU/ML

## 2024-04-18 PROCEDURE — 93000 ELECTROCARDIOGRAM COMPLETE: CPT

## 2024-04-18 PROCEDURE — 99214 OFFICE O/P EST MOD 30 MIN: CPT

## 2024-04-18 NOTE — HISTORY OF PRESENT ILLNESS
[FreeTextEntry1] : 72-yo female with h/o HTN, persistent A-fib. S/p ZAHRA/CV at [facility] in December 2019.   Patient is s/p DCCV on 0913/2022  S/p A-fib ablation in December 2022  Pt presents for a follow up visit. She reports her watch has shown that has been back in afib for the past 2 weeks, denies palpitations but feels fatigue. BP at home 115s in the morning, later 130s.   GFR 67  TSH 2.45.

## 2024-04-18 NOTE — PHYSICAL EXAM
[Well Developed] : well developed [No Acute Distress] : no acute distress [Obese] : obese [Normal Conjunctiva] : normal conjunctiva [Normal Venous Pressure] : normal venous pressure [No Carotid Bruit] : no carotid bruit [No Murmur] : no murmur [No Rub] : no rub [Clear Lung Fields] : clear lung fields [Good Air Entry] : good air entry [No Respiratory Distress] : no respiratory distress  [Soft] : abdomen soft [Non Tender] : non-tender [Normal Bowel Sounds] : normal bowel sounds [Normal Gait] : normal gait [No Edema] : no edema [No Cyanosis] : no cyanosis [No Clubbing] : no clubbing [No Varicosities] : no varicosities [No Rash] : no rash [Moves all extremities] : moves all extremities [Normal Speech] : normal speech [Alert and Oriented] : alert and oriented [Normal memory] : normal memory [No Gallop] : no gallop [de-identified] : irregular S1 and S2

## 2024-04-18 NOTE — ASSESSMENT
[FreeTextEntry1] : 72-yo female. HTN controlled. Persistent A-fib, s/p ablation. Recurrence with RVR now. CHADS Vasc score 3. Leg edema has improved.  Plan: Continue Xarelto. Continue Losartan 100 mg/HCTZ 25 mg daily. Start Multaq 400 mg bid (samples given). BW today. ZAHRA/CV next week if remains in A-fib. F/u after CV.  Ferdinand Suarez MD

## 2024-04-18 NOTE — CARDIOLOGY SUMMARY
[de-identified] : 4/18/23: Afib VR 147bpm  [de-identified] : 06/21/2022 - Normal LVEF 62%, normal diastolic function, mild thickening of aortic valve leaflets.

## 2024-04-19 LAB
HCT VFR BLD CALC: 48.1 %
HGB BLD-MCNC: 15.5 G/DL
MCHC RBC-ENTMCNC: 31.8 PG
MCHC RBC-ENTMCNC: 32.2 G/DL
MCV RBC AUTO: 98.8 FL
PLATELET # BLD AUTO: 175 K/UL
PMV BLD AUTO: 0 /100 WBCS
PMV BLD: 11.9 FL
RBC # BLD: 4.87 M/UL
RBC # FLD: 12.9 %
WBC # FLD AUTO: 5.59 K/UL

## 2024-04-24 ENCOUNTER — RESULT REVIEW (OUTPATIENT)
Age: 74
End: 2024-04-24

## 2024-04-24 ENCOUNTER — OUTPATIENT (OUTPATIENT)
Dept: OUTPATIENT SERVICES | Facility: HOSPITAL | Age: 74
LOS: 1 days | Discharge: HOME | End: 2024-04-24
Payer: MEDICARE

## 2024-04-24 VITALS — WEIGHT: 187.39 LBS

## 2024-04-24 DIAGNOSIS — Z98.890 OTHER SPECIFIED POSTPROCEDURAL STATES: Chronic | ICD-10-CM

## 2024-04-24 DIAGNOSIS — I48.19 OTHER PERSISTENT ATRIAL FIBRILLATION: ICD-10-CM

## 2024-04-24 DIAGNOSIS — Z90.710 ACQUIRED ABSENCE OF BOTH CERVIX AND UTERUS: Chronic | ICD-10-CM

## 2024-04-24 DIAGNOSIS — I25.119 ATHEROSCLEROTIC HEART DISEASE OF NATIVE CORONARY ARTERY WITH UNSPECIFIED ANGINA PECTORIS: ICD-10-CM

## 2024-04-24 DIAGNOSIS — Z90.89 ACQUIRED ABSENCE OF OTHER ORGANS: Chronic | ICD-10-CM

## 2024-04-24 PROCEDURE — 92960 CARDIOVERSION ELECTRIC EXT: CPT

## 2024-04-24 PROCEDURE — 93312 ECHO TRANSESOPHAGEAL: CPT | Mod: 26

## 2024-04-24 PROCEDURE — 93320 DOPPLER ECHO COMPLETE: CPT

## 2024-04-24 PROCEDURE — 93325 DOPPLER ECHO COLOR FLOW MAPG: CPT

## 2024-04-24 PROCEDURE — 93320 DOPPLER ECHO COMPLETE: CPT | Mod: 26

## 2024-04-24 PROCEDURE — 93325 DOPPLER ECHO COLOR FLOW MAPG: CPT | Mod: 26

## 2024-04-24 PROCEDURE — 93312 ECHO TRANSESOPHAGEAL: CPT

## 2024-04-24 NOTE — H&P CARDIOLOGY - HISTORY OF PRESENT ILLNESS
72 yo F with PMHx of HTN, Persistent AFib on Xarelto s/p ablation, multiple DCCV presents for ZAHRA/DCCV.  Reports recently noticing she went back into afib, often has chest discomfort when heart rate is elevated. Otherwise denies any complaints.

## 2024-04-24 NOTE — CHART NOTE - NSCHARTNOTEFT_GEN_A_CORE
POST OPERATIVE PROCEDURAL DOCUMENTATION  PRE-OP DIAGNOSIS: Atrial Fibrillation    POST-OP DIAGNOSIS: NSR    PROCEDURE: Transesophageal Echocardiogram     Primary Physician: Dr. Suarez  Cardiology Fellow: Cookie    Anesthesia:  Sedation as per documentation from anesthesia    CONDITION  [  ] Critical  [  ] Serious  [  ] Fair  [ x ] Good      ESTIMATED BLOOD LOSS: None    COMPLICATIONS: None    After risks and benefits of procedures were explained, informed consent was obtained and placed in chart.  Sedation as per documentation from anesthesia. The ZAHRA probe was passed into the esophagus without difficulty.  Transesophageal images were obtained.  The ZAHRA probe was removed without difficulty and examined.  There was no evidence for bleeding.  The patient tolerated the procedure well without any immediate ZAHRA-related complications.      Preliminary Findings:  LA: Mildly enlarged  CINDY: Left atrial appendage was clear of clot and smoke. Normal doppler velocities   LV: LVEF was estimated at 55-65%  MV: Trace MR, No MS  AV: No AI, no  AS  RA: Mildly enlarged  RV: Normal size and function  TV: Trace TR  PV: No WY, no PS  IAS: No PFO or ASD. No R-> L shunt by color doppler  Aorta: There was mild, non-mobile atheroma seen in the thoracic aorta.    Patient successfully converted to sinus rhythm with 200 J of synchronized direct current cardioversion (DCCV) via AP pads.    Full report to follow    PLAN OF CARE:  [x] Discharge home      [x] Continue Xarelto  [x] No eating or drinking for 1 hour  [x] No driving for 24 hours    Results of procedure/ plan of care discussed with patient/  in detail.

## 2024-05-10 ENCOUNTER — APPOINTMENT (OUTPATIENT)
Dept: ELECTROPHYSIOLOGY | Facility: CLINIC | Age: 74
End: 2024-05-10
Payer: MEDICARE

## 2024-05-10 VITALS
WEIGHT: 230 LBS | HEIGHT: 68 IN | DIASTOLIC BLOOD PRESSURE: 80 MMHG | BODY MASS INDEX: 34.86 KG/M2 | HEART RATE: 62 BPM | SYSTOLIC BLOOD PRESSURE: 150 MMHG | TEMPERATURE: 97.1 F

## 2024-05-10 DIAGNOSIS — I48.91 UNSPECIFIED ATRIAL FIBRILLATION: ICD-10-CM

## 2024-05-10 PROCEDURE — 93000 ELECTROCARDIOGRAM COMPLETE: CPT

## 2024-05-10 PROCEDURE — G2211 COMPLEX E/M VISIT ADD ON: CPT

## 2024-05-10 PROCEDURE — 99214 OFFICE O/P EST MOD 30 MIN: CPT

## 2024-05-10 RX ORDER — DRONEDARONE 400 MG/1
400 TABLET, FILM COATED ORAL TWICE DAILY
Qty: 180 | Refills: 1 | Status: ACTIVE | COMMUNITY
Start: 2024-04-30

## 2024-05-10 RX ORDER — HYDROCHLOROTHIAZIDE 25 MG/1
25 TABLET ORAL DAILY
Refills: 0 | Status: ACTIVE | COMMUNITY

## 2024-05-10 RX ORDER — LOSARTAN POTASSIUM 100 MG/1
100 TABLET, FILM COATED ORAL
Qty: 90 | Refills: 1 | Status: ACTIVE | COMMUNITY
Start: 2023-06-10

## 2024-05-10 RX ORDER — LEVOTHYROXINE SODIUM 0.05 MG/1
50 TABLET ORAL DAILY
Qty: 90 | Refills: 3 | Status: ACTIVE | COMMUNITY

## 2024-05-10 RX ORDER — RIVAROXABAN 20 MG/1
20 TABLET, FILM COATED ORAL
Qty: 90 | Refills: 1 | Status: ACTIVE | COMMUNITY
Start: 2022-06-01

## 2024-05-10 NOTE — ASSESSMENT
[FreeTextEntry1] : Atrial Fibrillation - in NSR today - CHADSVASC OF 3, S/P AF Ablation. - Continue Xarelto 20 mg QD, no bleeding. - Start Multaq 400 mg twice a day for better rate control at this time. Patient responded well to Multaq in the past.  - Recommend ZAHRA and cardioversion.   - Discussed with patient the possibility of a second ablation if patient continues to have recurrence of atrial fibrillation.     Hypertension - Patient blood pressure is well controlled. - Continue Losartan 100 mg QD. - Continue Hydrochlorothiazide 25 mg QD.

## 2024-05-10 NOTE — HISTORY OF PRESENT ILLNESS
[FreeTextEntry1] : Patient is a very pleasant 72 year old woman with history of hypertension, hyperthyroidism, and persistent atrial fibrillation for the past 6-8 months. Patient recently underwent cardioversion back to sinus rhythm and had failed flecainide in the past.   Patient S/P AF Cryoablation on 12/12/2022, CHADSVASC OF 3. Patient comes for follow-up after the Ablation and is feeling great.    Patient is currently in normal sinus rhythm. A few months ago, patient was in AF but converted spontaneously to normal sinus rhythm. Patient does complain of some tiredness.     Patient comes to the office today for follow-up and was found to be back in atrial fibrillation. Patient has been seeing Dr. Juarez and was also seen by me today.    EKG (4/18/24) AF EKG (10/13/2023): Sinus rhythm at 62 bpm.  Event monitor (08/2023): Normal heart rate, average heart rate of 67 bpm. No atrial fibrillation. 2 second pause in the middle of the night. No other arrhythmias.   EKG (08/11/2023): Sinus rhythm at 66 bpm with junctional beats and PVC, left axis deviation. EKG (1/31/2023) sinus rhythm 69 BMP with First Degree AV Block.   ZAHRA (09/13/2022): Ejection fraction of 55-60%, mildly enlarged left atrium, no major valvular disease.

## 2024-05-10 NOTE — ADDENDUM
[FreeTextEntry1] : Veronika WHITING assisted in documentation on 04/18/2024   acting as a scribe for Dr. Nilton Galindo.

## 2024-06-22 NOTE — ADDENDUM
[FreeTextEntry1] : Veronika WHITING assisted in documentation on 05/10/2024   acting as a scribe for Dr. Nilton Galindo.

## 2024-06-22 NOTE — HISTORY OF PRESENT ILLNESS
[FreeTextEntry1] : Patient is a very pleasant 72 year old woman with history of hypertension, hyperthyroidism, and persistent atrial fibrillation for the past 6-8 months. Patient recently underwent cardioversion back to sinus rhythm and had failed flecainide in the past.   Patient S/P AF Cryoablation on 12/12/2022, CHADSVASC OF 3. Patient comes for follow-up after the Ablation and is feeling great.    05/10/2024: Patient comes to the office after ZAHRA and cardioversion for recurrence of atrial fibrillation. Patient is currently taking Multaq and is doing very well. No signs of recurrence at this time.   EKG (05/10/2024);: Sinus rhythm at 62 bpm. ZAHRA (04/2024): EF Of 55-60%. mildly enlarged LA, no major valvular disease, mild AI, mild MR.  EKG (10/13/2023): Sinus rhythm at 62 bpm.  Event monitor (08/2023): Normal heart rate, average heart rate of 67 bpm. No atrial fibrillation. 2 second pause in the middle of the night. No other arrhythmias.   EKG (08/11/2023): Sinus rhythm at 66 bpm with junctional beats and PVC, left axis deviation. EKG (1/31/2023) sinus rhythm 69 BMP with First Degree AV Block.   ZAHRA (09/13/2022): Ejection fraction of 55-60%, mildly enlarged left atrium, no major valvular disease.

## 2024-06-22 NOTE — ASSESSMENT
[FreeTextEntry1] : Atrial Fibrillation - in NSR today s/p DCCV - CHADSVASC OF 3, S/P AF Ablation. - Continue Xarelto 20 mg QD, no bleeding. - Continue Multaq 400 mg twice a day  - Patient is doing well. No signs of recurrence.  - If patient continues to have more episodes of recurrence, discussed with patient the possibility of second ablation.     Hypertension - Patient blood pressure is well controlled. - Continue Losartan 100 mg QD. - Continue Hydrochlorothiazide 25 mg QD.

## 2024-07-01 ENCOUNTER — APPOINTMENT (OUTPATIENT)
Dept: SURGERY | Facility: CLINIC | Age: 74
End: 2024-07-01

## 2024-07-01 VITALS
WEIGHT: 225 LBS | HEART RATE: 81 BPM | BODY MASS INDEX: 34.1 KG/M2 | OXYGEN SATURATION: 97 % | SYSTOLIC BLOOD PRESSURE: 140 MMHG | HEIGHT: 68 IN | DIASTOLIC BLOOD PRESSURE: 78 MMHG

## 2024-07-01 DIAGNOSIS — Z60.2 PROBLEMS RELATED TO LIVING ALONE: ICD-10-CM

## 2024-07-01 DIAGNOSIS — M17.9 OSTEOARTHRITIS OF KNEE, UNSPECIFIED: ICD-10-CM

## 2024-07-01 DIAGNOSIS — I48.91 UNSPECIFIED ATRIAL FIBRILLATION: ICD-10-CM

## 2024-07-01 DIAGNOSIS — E66.9 OBESITY, UNSPECIFIED: ICD-10-CM

## 2024-07-01 DIAGNOSIS — R93.89 ABNORMAL FINDINGS ON DIAGNOSTIC IMAGING OF OTHER SPECIFIED BODY STRUCTURES: ICD-10-CM

## 2024-07-01 DIAGNOSIS — E03.9 HYPOTHYROIDISM, UNSPECIFIED: ICD-10-CM

## 2024-07-01 DIAGNOSIS — Z09 ENCOUNTER FOR FOLLOW-UP EXAMINATION AFTER COMPLETED TREATMENT FOR CONDITIONS OTHER THAN MALIGNANT NEOPLASM: ICD-10-CM

## 2024-07-01 DIAGNOSIS — E88.810 METABOLIC SYNDROME: ICD-10-CM

## 2024-07-01 DIAGNOSIS — Z97.5 PRESENCE OF (INTRAUTERINE) CONTRACEPTIVE DEVICE: ICD-10-CM

## 2024-07-01 DIAGNOSIS — E88.819 INSULIN RESISTANCE, UNSPECIFIED: ICD-10-CM

## 2024-07-01 DIAGNOSIS — G62.9 POLYNEUROPATHY, UNSPECIFIED: ICD-10-CM

## 2024-07-01 DIAGNOSIS — Z82.49 FAMILY HISTORY OF ISCHEMIC HEART DISEASE AND OTHER DISEASES OF THE CIRCULATORY SYSTEM: ICD-10-CM

## 2024-07-01 PROCEDURE — G2211 COMPLEX E/M VISIT ADD ON: CPT

## 2024-07-01 PROCEDURE — 99205 OFFICE O/P NEW HI 60 MIN: CPT

## 2024-07-01 SDOH — SOCIAL STABILITY - SOCIAL INSECURITY: PROBLEMS RELATED TO LIVING ALONE: Z60.2

## 2024-07-02 ENCOUNTER — APPOINTMENT (OUTPATIENT)
Dept: CARDIOLOGY | Facility: CLINIC | Age: 74
End: 2024-07-02
Payer: MEDICARE

## 2024-07-02 ENCOUNTER — OUTPATIENT (OUTPATIENT)
Dept: OUTPATIENT SERVICES | Facility: HOSPITAL | Age: 74
LOS: 1 days | End: 2024-07-02

## 2024-07-02 VITALS
WEIGHT: 226 LBS | DIASTOLIC BLOOD PRESSURE: 70 MMHG | BODY MASS INDEX: 34.25 KG/M2 | HEART RATE: 64 BPM | HEIGHT: 68 IN | SYSTOLIC BLOOD PRESSURE: 140 MMHG

## 2024-07-02 DIAGNOSIS — I10 ESSENTIAL (PRIMARY) HYPERTENSION: ICD-10-CM

## 2024-07-02 DIAGNOSIS — I48.19 OTHER PERSISTENT ATRIAL FIBRILLATION: ICD-10-CM

## 2024-07-02 DIAGNOSIS — Z90.710 ACQUIRED ABSENCE OF BOTH CERVIX AND UTERUS: Chronic | ICD-10-CM

## 2024-07-02 DIAGNOSIS — E66.9 OBESITY, UNSPECIFIED: ICD-10-CM

## 2024-07-02 DIAGNOSIS — Z98.890 OTHER SPECIFIED POSTPROCEDURAL STATES: Chronic | ICD-10-CM

## 2024-07-02 DIAGNOSIS — Z90.89 ACQUIRED ABSENCE OF OTHER ORGANS: Chronic | ICD-10-CM

## 2024-07-02 PROBLEM — E88.810 METABOLIC SYNDROME: Status: ACTIVE | Noted: 2024-07-01

## 2024-07-02 PROCEDURE — 93000 ELECTROCARDIOGRAM COMPLETE: CPT

## 2024-07-02 PROCEDURE — 99214 OFFICE O/P EST MOD 30 MIN: CPT

## 2024-07-03 DIAGNOSIS — E66.9 OBESITY, UNSPECIFIED: ICD-10-CM

## 2024-07-03 LAB
25(OH)D3 SERPL-MCNC: 30 NG/ML
ALBUMIN SERPL ELPH-MCNC: 4.4 G/DL
ALP BLD-CCNC: 96 U/L
ALT SERPL-CCNC: 23 U/L
ANION GAP SERPL CALC-SCNC: 13 MMOL/L
AST SERPL-CCNC: 25 U/L
BILIRUB SERPL-MCNC: 0.8 MG/DL
BUN SERPL-MCNC: 20 MG/DL
CALCIUM SERPL-MCNC: 9.3 MG/DL
CHLORIDE SERPL-SCNC: 99 MMOL/L
CHOLEST SERPL-MCNC: 195 MG/DL
CO2 SERPL-SCNC: 26 MMOL/L
CREAT SERPL-MCNC: 0.9 MG/DL
EGFR: 67 ML/MIN/1.73M2
ESTIMATED AVERAGE GLUCOSE: 105 MG/DL
GLUCOSE SERPL-MCNC: 102 MG/DL
HBA1C MFR BLD HPLC: 5.3 %
HCT VFR BLD CALC: 46.5 %
HDLC SERPL-MCNC: 54 MG/DL
HGB BLD-MCNC: 15.7 G/DL
LDLC SERPL CALC-MCNC: 127 MG/DL
MCHC RBC-ENTMCNC: 32.1 PG
MCHC RBC-ENTMCNC: 33.8 G/DL
MCV RBC AUTO: 95.1 FL
NONHDLC SERPL-MCNC: 141 MG/DL
PLATELET # BLD AUTO: 181 K/UL
PMV BLD AUTO: 0 /100 WBCS
PMV BLD: 12.4 FL
POTASSIUM SERPL-SCNC: 4.1 MMOL/L
PROT SERPL-MCNC: 7.1 G/DL
RBC # BLD: 4.89 M/UL
RBC # FLD: 12.4 %
SODIUM SERPL-SCNC: 138 MMOL/L
TRIGL SERPL-MCNC: 68 MG/DL
VIT B12 SERPL-MCNC: 626 PG/ML
WBC # FLD AUTO: 5.12 K/UL

## 2024-07-20 PROBLEM — Z82.49 FAMILY HISTORY OF CARDIAC DISORDER: Status: ACTIVE | Noted: 2024-07-20

## 2024-07-20 PROBLEM — G62.9 NEUROPATHY: Status: ACTIVE | Noted: 2024-07-20

## 2024-07-20 PROBLEM — Z97.5 IUD (INTRAUTERINE DEVICE) IN PLACE: Status: RESOLVED | Noted: 2017-03-01 | Resolved: 2024-07-20

## 2024-07-20 PROBLEM — Z60.2 LIVES ALONE: Status: ACTIVE | Noted: 2024-07-20

## 2024-07-20 PROBLEM — Z09 POSTOP CHECK: Status: RESOLVED | Noted: 2020-07-27 | Resolved: 2024-07-20

## 2024-07-20 PROBLEM — R93.89 THICKENED ENDOMETRIUM: Status: RESOLVED | Noted: 2020-01-07 | Resolved: 2024-07-20

## 2024-07-20 PROBLEM — M17.9 OSTEOARTHRITIS, KNEE: Status: ACTIVE | Noted: 2024-07-20

## 2024-07-20 RX ORDER — TIRZEPATIDE 2.5 MG/.5ML
2.5 INJECTION, SOLUTION SUBCUTANEOUS
Qty: 1 | Refills: 1 | Status: ACTIVE | COMMUNITY
Start: 2024-07-01

## 2024-07-20 RX ORDER — ELECTROLYTES/DEXTROSE
SOLUTION, ORAL ORAL
Refills: 0 | Status: ACTIVE | COMMUNITY

## 2024-07-20 RX ORDER — GLUCOSAM/CHONDRO/HERB 149/HYAL 750-100 MG
TABLET ORAL
Refills: 0 | Status: ACTIVE | COMMUNITY

## 2024-08-09 ENCOUNTER — APPOINTMENT (OUTPATIENT)
Dept: ELECTROPHYSIOLOGY | Facility: CLINIC | Age: 74
End: 2024-08-09

## 2024-08-09 PROCEDURE — 93000 ELECTROCARDIOGRAM COMPLETE: CPT

## 2024-08-09 PROCEDURE — G2211 COMPLEX E/M VISIT ADD ON: CPT

## 2024-08-09 PROCEDURE — 99214 OFFICE O/P EST MOD 30 MIN: CPT

## 2024-08-09 NOTE — HISTORY OF PRESENT ILLNESS
[FreeTextEntry1] : Patient is a very pleasant 72 year old woman with history of hypertension, hyperthyroidism, and persistent atrial fibrillation for the past 6-8 months. Patient recently underwent cardioversion back to sinus rhythm and had failed flecainide in the past.   Patient S/P AF Cryoablation on 12/12/2022, CHADSVASC OF 3. Patient comes for follow-up after the Ablation and is feeling great.    05/10/2024: Patient comes to the office after ZAHRA and cardioversion for recurrence of atrial fibrillation. Patient is currently taking Multaq and is doing very well. No signs of recurrence at this time.   08/09/2024: Patient complained of some lightheadedness and dizziness, no syncope. She noticed at night when she checks her pulse, it's in the 40s. It occurs after patient was started in the multaq   EKG (08/09/2024): Sinus bradycardia 52bpm EKG (05/10/2024);: Sinus rhythm at 62 bpm. ZAHRA (04/2024): EF Of 55-60%. mildly enlarged LA, no major valvular disease, mild AI, mild MR.  EKG (10/13/2023): Sinus rhythm at 62 bpm.  Event monitor (08/2023): Normal heart rate, average heart rate of 67 bpm. No atrial fibrillation. 2 second pause in the middle of the night. No other arrhythmias.   EKG (08/11/2023): Sinus rhythm at 66 bpm with junctional beats and PVC, left axis deviation. EKG (1/31/2023) sinus rhythm 69 BMP with First Degree AV Block.   ZAHRA (09/13/2022): Ejection fraction of 55-60%, mildly enlarged left atrium, no major valvular disease.

## 2024-08-09 NOTE — ASSESSMENT
[FreeTextEntry1] : Atrial Fibrillation - in NSR today s/p DCCV - CHADSVASC OF 3, S/P AF Ablation. - Continue Xarelto 20 mg QD, no bleeding. - Continue Multaq 400 mg twice a day  - Patient is doing well. No signs of recurrence.  - If patient continues to have more episodes of recurrence, discussed with patient the possibility of second ablation.     Hypertension - Patient blood pressure is well controlled. - Continue Losartan 100 mg QD. - Continue Hydrochlorothiazide 25 mg QD.   Brachycardia  -Patient has relative brachycardia with questionable symptoms.  -Event monitor to evaluate patient's brachycardia and correlate with symptoms.  -If patients show brugada, it might be related to Multaq.  -Given the fact, we may consider pacemaker implant after the results of the event monitor.

## 2024-08-09 NOTE — ADDENDUM
[FreeTextEntry1] : IVeronika assisted in documentation on 05/10/2024   acting as a scribe for Dr. Nilton Galindo.   IEkta (scribe) assisted in filling out this chart under the dictation of Nilton Galindo on 08/09/2024

## 2024-08-13 ENCOUNTER — APPOINTMENT (OUTPATIENT)
Dept: SURGERY | Facility: CLINIC | Age: 74
End: 2024-08-13
Payer: MEDICARE

## 2024-08-13 VITALS — WEIGHT: 231 LBS | BODY MASS INDEX: 35.01 KG/M2 | HEIGHT: 68 IN

## 2024-08-13 PROCEDURE — ZZZZZ: CPT

## 2024-08-14 ENCOUNTER — NON-APPOINTMENT (OUTPATIENT)
Age: 74
End: 2024-08-14

## 2024-08-26 ENCOUNTER — RX RENEWAL (OUTPATIENT)
Age: 74
End: 2024-08-26

## 2024-08-26 ENCOUNTER — APPOINTMENT (OUTPATIENT)
Dept: SURGERY | Facility: CLINIC | Age: 74
End: 2024-08-26

## 2024-09-20 ENCOUNTER — APPOINTMENT (OUTPATIENT)
Dept: SURGERY | Facility: CLINIC | Age: 74
End: 2024-09-20
Payer: MEDICARE

## 2024-09-20 VITALS — BODY MASS INDEX: 33.65 KG/M2 | WEIGHT: 222 LBS | HEIGHT: 68 IN

## 2024-09-20 PROCEDURE — ZZZZZ: CPT

## 2024-09-27 ENCOUNTER — APPOINTMENT (OUTPATIENT)
Dept: ELECTROPHYSIOLOGY | Facility: CLINIC | Age: 74
End: 2024-09-27

## 2024-09-27 VITALS
SYSTOLIC BLOOD PRESSURE: 138 MMHG | HEART RATE: 67 BPM | WEIGHT: 222 LBS | HEIGHT: 68 IN | DIASTOLIC BLOOD PRESSURE: 76 MMHG | BODY MASS INDEX: 33.65 KG/M2 | TEMPERATURE: 97.1 F

## 2024-09-27 DIAGNOSIS — I48.91 UNSPECIFIED ATRIAL FIBRILLATION: ICD-10-CM

## 2024-09-27 DIAGNOSIS — R00.1 BRADYCARDIA, UNSPECIFIED: ICD-10-CM

## 2024-09-27 PROCEDURE — 93000 ELECTROCARDIOGRAM COMPLETE: CPT

## 2024-09-27 PROCEDURE — 99214 OFFICE O/P EST MOD 30 MIN: CPT

## 2024-09-27 PROCEDURE — G2211 COMPLEX E/M VISIT ADD ON: CPT

## 2024-09-27 RX ORDER — FUROSEMIDE 20 MG/1
20 TABLET ORAL
Refills: 0 | Status: ACTIVE | COMMUNITY

## 2024-09-27 RX ORDER — VIT C/E/ZN/COPPR/LUTEIN/ZEAXAN 250MG-90MG
CAPSULE ORAL TWICE DAILY
Refills: 0 | Status: ACTIVE | COMMUNITY

## 2024-09-27 RX ORDER — VALSARTAN 160 MG/1
160 TABLET, COATED ORAL TWICE DAILY
Refills: 0 | Status: ACTIVE | COMMUNITY

## 2024-09-27 NOTE — ASSESSMENT
[FreeTextEntry1] : Atrial Fibrillation - in NSR today s/p DCCV - CHADSVASC OF 3, S/P AF Ablation. - Continue Xarelto 20 mg QD, no bleeding. - Patient stopped Multaq - Patient is doing well. No signs of recurrence.  - Patient had a very low AF burden, (total 14 minutes), we will continue to monitor at this time.  Bradycardia - Patient feels much better after stopping Multaq.  - Today, heart rate is appropriate and patient's heart rate on the event monitor was between 35 and 150 bpm.  - At this time, I would recommend to continue to monitor. No indication for pacemaker at this naveen  Hypertension - Patient blood pressure is well controlled. - Continue Losartan 100 mg QD. - Continue Hydrochlorothiazide 25 mg QD.

## 2024-09-27 NOTE — HISTORY OF PRESENT ILLNESS
[FreeTextEntry1] : Patient is a very pleasant 72 year old woman with history of hypertension, hyperthyroidism, and persistent atrial fibrillation for the past 6-8 months. Patient recently underwent cardioversion back to sinus rhythm and had failed flecainide in the past.   Patient S/P AF Cryoablation on 12/12/2022, CHADSVASC OF 3. Patient comes for follow-up after the Ablation and is feeling great.    05/10/2024: Patient comes to the office after ZAHRA and cardioversion for recurrence of atrial fibrillation. Patient is currently taking Multaq and is doing very well. No signs of recurrence at this time.   08/09/2024: Patient complained of some lightheadedness and dizziness, no syncope. She noticed at night when she checks her pulse, it's in the 40s. It occurs after patient was started in the multaq   (09/27/2024): Patient comes to the office for follow-up and review results of an event monitor. Patient stopped their MULTAq and she feels much better. She denies any lightheadedness, dizziness, and syncope. She also lost eight pounds.    EKG (09/27/2024): Sinus rhythm 67bpm Event monitor (08/2024):  Average heart rate of 61bpm, and 1% AF burden with 17 episodes EKG (08/09/2024): Sinus bradycardia 52bpm EKG (05/10/2024);: Sinus rhythm at 62 bpm. ZAHRA (04/2024): EF Of 55-60%. mildly enlarged LA, no major valvular disease, mild AI, mild MR.  EKG (10/13/2023): Sinus rhythm at 62 bpm.  Event monitor (08/2023): Normal heart rate, average heart rate of 67 bpm. No atrial fibrillation. 2 second pause in the middle of the night. No other arrhythmias.   EKG (08/11/2023): Sinus rhythm at 66 bpm with junctional beats and PVC, left axis deviation. EKG (1/31/2023) sinus rhythm 69 BMP with First Degree AV Block.   ZAHRA (09/13/2022): Ejection fraction of 55-60%, mildly enlarged left atrium, no major valvular disease.

## 2024-11-12 ENCOUNTER — NON-APPOINTMENT (OUTPATIENT)
Age: 74
End: 2024-11-12

## 2024-11-12 ENCOUNTER — APPOINTMENT (OUTPATIENT)
Dept: CARDIOLOGY | Facility: CLINIC | Age: 74
End: 2024-11-12
Payer: MEDICARE

## 2024-11-12 VITALS
BODY MASS INDEX: 33.04 KG/M2 | HEART RATE: 80 BPM | WEIGHT: 218 LBS | DIASTOLIC BLOOD PRESSURE: 70 MMHG | HEIGHT: 68 IN | SYSTOLIC BLOOD PRESSURE: 140 MMHG

## 2024-11-12 DIAGNOSIS — E66.811 OBESITY, CLASS 1: ICD-10-CM

## 2024-11-12 DIAGNOSIS — E78.5 HYPERLIPIDEMIA, UNSPECIFIED: ICD-10-CM

## 2024-11-12 DIAGNOSIS — I48.19 OTHER PERSISTENT ATRIAL FIBRILLATION: ICD-10-CM

## 2024-11-12 PROCEDURE — 99214 OFFICE O/P EST MOD 30 MIN: CPT

## 2024-11-12 PROCEDURE — 93000 ELECTROCARDIOGRAM COMPLETE: CPT

## 2024-11-12 RX ORDER — ATORVASTATIN CALCIUM 10 MG/1
10 TABLET, FILM COATED ORAL
Qty: 90 | Refills: 1 | Status: ACTIVE | COMMUNITY
Start: 2024-11-12 | End: 1900-01-01

## 2024-11-23 ENCOUNTER — NON-APPOINTMENT (OUTPATIENT)
Age: 74
End: 2024-11-23

## 2025-01-21 ENCOUNTER — APPOINTMENT (OUTPATIENT)
Dept: ELECTROPHYSIOLOGY | Facility: CLINIC | Age: 75
End: 2025-01-21

## 2025-01-21 ENCOUNTER — NON-APPOINTMENT (OUTPATIENT)
Age: 75
End: 2025-01-21

## 2025-01-21 VITALS
TEMPERATURE: 97.1 F | BODY MASS INDEX: 31.22 KG/M2 | SYSTOLIC BLOOD PRESSURE: 138 MMHG | DIASTOLIC BLOOD PRESSURE: 78 MMHG | HEART RATE: 73 BPM | HEIGHT: 68 IN | WEIGHT: 206 LBS

## 2025-01-21 DIAGNOSIS — R00.1 BRADYCARDIA, UNSPECIFIED: ICD-10-CM

## 2025-01-21 DIAGNOSIS — I48.91 UNSPECIFIED ATRIAL FIBRILLATION: ICD-10-CM

## 2025-01-21 DIAGNOSIS — I10 ESSENTIAL (PRIMARY) HYPERTENSION: ICD-10-CM

## 2025-01-21 PROCEDURE — G2211 COMPLEX E/M VISIT ADD ON: CPT

## 2025-01-21 PROCEDURE — 99214 OFFICE O/P EST MOD 30 MIN: CPT

## 2025-01-21 PROCEDURE — 93000 ELECTROCARDIOGRAM COMPLETE: CPT

## 2025-04-10 NOTE — CARDIOLOGY SUMMARY
[de-identified] : 09/07/22:\par A-fib, /min, RVCD, LAFB with poor R wave progression.\par  [de-identified] : 06/21/2022 - Normal LVEF 62%, normal diastolic function, mild thickening of aortic valve leaflets. Detail Level: Zone

## 2025-05-12 ENCOUNTER — APPOINTMENT (OUTPATIENT)
Dept: CARDIOLOGY | Facility: CLINIC | Age: 75
End: 2025-05-12
Payer: MEDICARE

## 2025-05-12 VITALS
HEART RATE: 92 BPM | HEIGHT: 68 IN | BODY MASS INDEX: 29.4 KG/M2 | SYSTOLIC BLOOD PRESSURE: 110 MMHG | WEIGHT: 194 LBS | DIASTOLIC BLOOD PRESSURE: 80 MMHG

## 2025-05-12 DIAGNOSIS — I48.19 OTHER PERSISTENT ATRIAL FIBRILLATION: ICD-10-CM

## 2025-05-12 DIAGNOSIS — E78.5 HYPERLIPIDEMIA, UNSPECIFIED: ICD-10-CM

## 2025-05-12 DIAGNOSIS — I10 ESSENTIAL (PRIMARY) HYPERTENSION: ICD-10-CM

## 2025-05-12 DIAGNOSIS — R60.9 EDEMA, UNSPECIFIED: ICD-10-CM

## 2025-05-12 PROCEDURE — 93000 ELECTROCARDIOGRAM COMPLETE: CPT

## 2025-05-12 PROCEDURE — 99214 OFFICE O/P EST MOD 30 MIN: CPT

## 2025-07-15 ENCOUNTER — RX RENEWAL (OUTPATIENT)
Age: 75
End: 2025-07-15

## 2025-09-05 ENCOUNTER — APPOINTMENT (OUTPATIENT)
Dept: ELECTROPHYSIOLOGY | Facility: CLINIC | Age: 75
End: 2025-09-05

## 2025-09-05 VITALS
SYSTOLIC BLOOD PRESSURE: 150 MMHG | DIASTOLIC BLOOD PRESSURE: 80 MMHG | HEIGHT: 68 IN | BODY MASS INDEX: 29.4 KG/M2 | WEIGHT: 194 LBS | HEART RATE: 69 BPM

## 2025-09-05 DIAGNOSIS — R00.1 BRADYCARDIA, UNSPECIFIED: ICD-10-CM

## 2025-09-05 DIAGNOSIS — I10 ESSENTIAL (PRIMARY) HYPERTENSION: ICD-10-CM

## 2025-09-05 DIAGNOSIS — I48.91 UNSPECIFIED ATRIAL FIBRILLATION: ICD-10-CM

## 2025-09-05 PROCEDURE — 99214 OFFICE O/P EST MOD 30 MIN: CPT

## 2025-09-05 PROCEDURE — 93000 ELECTROCARDIOGRAM COMPLETE: CPT

## 2025-09-05 PROCEDURE — G2211 COMPLEX E/M VISIT ADD ON: CPT

## 2025-09-05 RX ORDER — OMEPRAZOLE 40 MG/1
40 CAPSULE, DELAYED RELEASE ORAL
Qty: 1 | Refills: 6 | Status: ACTIVE | COMMUNITY